# Patient Record
Sex: MALE | Race: WHITE | URBAN - METROPOLITAN AREA
[De-identification: names, ages, dates, MRNs, and addresses within clinical notes are randomized per-mention and may not be internally consistent; named-entity substitution may affect disease eponyms.]

---

## 2018-02-19 ENCOUNTER — OFFICE VISIT (OUTPATIENT)
Dept: FAMILY MEDICINE CLINIC | Age: 48
End: 2018-02-19

## 2018-02-19 ENCOUNTER — TELEPHONE (OUTPATIENT)
Dept: FAMILY MEDICINE CLINIC | Age: 48
End: 2018-02-19

## 2018-02-19 VITALS
BODY MASS INDEX: 24.79 KG/M2 | SYSTOLIC BLOOD PRESSURE: 120 MMHG | DIASTOLIC BLOOD PRESSURE: 80 MMHG | OXYGEN SATURATION: 98 % | HEIGHT: 72 IN | HEART RATE: 85 BPM | WEIGHT: 183 LBS

## 2018-02-19 DIAGNOSIS — Z13.220 SCREENING CHOLESTEROL LEVEL: ICD-10-CM

## 2018-02-19 DIAGNOSIS — R00.2 PALPITATIONS: Primary | ICD-10-CM

## 2018-02-19 PROCEDURE — 93000 ELECTROCARDIOGRAM COMPLETE: CPT | Performed by: FAMILY MEDICINE

## 2018-02-19 PROCEDURE — 99214 OFFICE O/P EST MOD 30 MIN: CPT | Performed by: FAMILY MEDICINE

## 2018-02-19 ASSESSMENT — ENCOUNTER SYMPTOMS
SHORTNESS OF BREATH: 0
COUGH: 0
CHEST TIGHTNESS: 1
WHEEZING: 0
SORE THROAT: 0

## 2018-02-19 NOTE — PROGRESS NOTES
Patient:  Porfirio bello 52 y.o. Radha Rowe presents today with the following Chief Complaint(s):  Chief Complaint   Patient presents with    Hypertension     C/o elevated BP- Pt went [facility], yesterday, 149/82. Pt states he has noticed an elevated heart rate, tightness in the chest, winded, on sunday morning. Pt states he still feels tightness in the chest.        Patient was last seen 3 years ago. He was doing fine until yesterday he started to feel strange. He can't really not be more specific than that. He does feel like his heart is racing. He denies fever chills or cough stomach pain nausea vomiting. He went to the pharmacy where they checked his blood pressure and they told him to rest. So we spent the day on the couch. Today he still feels strange. He is worried about his blood pressure being elevated. No current outpatient prescriptions on file. No current facility-administered medications for this visit. Patients past medical history, surgical history, family history, medications and allergies were all reviewed and updated as appropriate today. Review of Systems   Constitutional: Positive for fatigue. Negative for chills and fever. HENT: Negative for sore throat. Respiratory: Positive for chest tightness. Negative for cough, shortness of breath and wheezing. Cardiovascular: Positive for palpitations. Negative for chest pain and leg swelling. Physical Exam   Constitutional: No distress. Eyes: Conjunctivae are normal.   Neck: Neck supple. No JVD present. Cardiovascular: Normal rate, regular rhythm and normal heart sounds. No murmur heard. Pulmonary/Chest: Effort normal and breath sounds normal. No respiratory distress. He has no wheezes. He has no rales. Abdominal: Soft. Musculoskeletal: He exhibits no edema. Neurological: He is alert. Psychiatric: He has a normal mood and affect.        Vitals:    02/19/18 1605 02/19/18 1630 02/19/18 1631   BP: (!)

## 2018-02-27 ENCOUNTER — NURSE ONLY (OUTPATIENT)
Dept: FAMILY MEDICINE CLINIC | Age: 48
End: 2018-02-27

## 2018-02-27 DIAGNOSIS — Z13.220 SCREENING CHOLESTEROL LEVEL: ICD-10-CM

## 2018-02-27 DIAGNOSIS — R00.2 PALPITATIONS: ICD-10-CM

## 2018-02-27 PROCEDURE — 36415 COLL VENOUS BLD VENIPUNCTURE: CPT | Performed by: FAMILY MEDICINE

## 2018-02-28 LAB
A/G RATIO: 1.7 (ref 1.1–2.2)
ALBUMIN SERPL-MCNC: 4.5 G/DL (ref 3.4–5)
ALP BLD-CCNC: 67 U/L (ref 40–129)
ALT SERPL-CCNC: 18 U/L (ref 10–40)
ANION GAP SERPL CALCULATED.3IONS-SCNC: 16 MMOL/L (ref 3–16)
AST SERPL-CCNC: 19 U/L (ref 15–37)
BASOPHILS ABSOLUTE: 0.1 K/UL (ref 0–0.2)
BASOPHILS RELATIVE PERCENT: 1.3 %
BILIRUB SERPL-MCNC: 0.6 MG/DL (ref 0–1)
BUN BLDV-MCNC: 18 MG/DL (ref 7–20)
CALCIUM SERPL-MCNC: 9.5 MG/DL (ref 8.3–10.6)
CHLORIDE BLD-SCNC: 103 MMOL/L (ref 99–110)
CHOLESTEROL, TOTAL: 166 MG/DL (ref 0–199)
CO2: 24 MMOL/L (ref 21–32)
CREAT SERPL-MCNC: 0.9 MG/DL (ref 0.9–1.3)
EOSINOPHILS ABSOLUTE: 0.2 K/UL (ref 0–0.6)
EOSINOPHILS RELATIVE PERCENT: 2.5 %
ESTIMATED AVERAGE GLUCOSE: 93.9 MG/DL
GFR AFRICAN AMERICAN: >60
GFR NON-AFRICAN AMERICAN: >60
GLOBULIN: 2.6 G/DL
GLUCOSE BLD-MCNC: 90 MG/DL (ref 70–99)
HBA1C MFR BLD: 4.9 %
HCT VFR BLD CALC: 42.1 % (ref 40.5–52.5)
HDLC SERPL-MCNC: 41 MG/DL (ref 40–60)
HEMOGLOBIN: 14.8 G/DL (ref 13.5–17.5)
LDL CHOLESTEROL CALCULATED: 109 MG/DL
LYMPHOCYTES ABSOLUTE: 1.8 K/UL (ref 1–5.1)
LYMPHOCYTES RELATIVE PERCENT: 29.6 %
MCH RBC QN AUTO: 31.4 PG (ref 26–34)
MCHC RBC AUTO-ENTMCNC: 35.2 G/DL (ref 31–36)
MCV RBC AUTO: 89 FL (ref 80–100)
MONOCYTES ABSOLUTE: 0.5 K/UL (ref 0–1.3)
MONOCYTES RELATIVE PERCENT: 9.1 %
NEUTROPHILS ABSOLUTE: 3.4 K/UL (ref 1.7–7.7)
NEUTROPHILS RELATIVE PERCENT: 57.5 %
PDW BLD-RTO: 12.9 % (ref 12.4–15.4)
PLATELET # BLD: 282 K/UL (ref 135–450)
PMV BLD AUTO: 8.1 FL (ref 5–10.5)
POTASSIUM SERPL-SCNC: 4.1 MMOL/L (ref 3.5–5.1)
RBC # BLD: 4.73 M/UL (ref 4.2–5.9)
SODIUM BLD-SCNC: 143 MMOL/L (ref 136–145)
TOTAL PROTEIN: 7.1 G/DL (ref 6.4–8.2)
TRIGL SERPL-MCNC: 78 MG/DL (ref 0–150)
VLDLC SERPL CALC-MCNC: 16 MG/DL
WBC # BLD: 6 K/UL (ref 4–11)

## 2019-02-18 ENCOUNTER — OFFICE VISIT (OUTPATIENT)
Dept: FAMILY MEDICINE CLINIC | Age: 49
End: 2019-02-18
Payer: COMMERCIAL

## 2019-02-18 VITALS
HEIGHT: 72 IN | HEART RATE: 83 BPM | SYSTOLIC BLOOD PRESSURE: 126 MMHG | DIASTOLIC BLOOD PRESSURE: 80 MMHG | BODY MASS INDEX: 24.19 KG/M2 | WEIGHT: 178.6 LBS | OXYGEN SATURATION: 99 % | RESPIRATION RATE: 16 BRPM

## 2019-02-18 DIAGNOSIS — R20.0 NUMBNESS AND TINGLING: Primary | ICD-10-CM

## 2019-02-18 DIAGNOSIS — R20.2 NUMBNESS AND TINGLING: Primary | ICD-10-CM

## 2019-02-18 LAB
A/G RATIO: 1.7 (ref 1.1–2.2)
ALBUMIN SERPL-MCNC: 4.5 G/DL (ref 3.4–5)
ALP BLD-CCNC: 74 U/L (ref 40–129)
ALT SERPL-CCNC: 29 U/L (ref 10–40)
ANION GAP SERPL CALCULATED.3IONS-SCNC: 16 MMOL/L (ref 3–16)
AST SERPL-CCNC: 24 U/L (ref 15–37)
BASOPHILS ABSOLUTE: 0.1 K/UL (ref 0–0.2)
BASOPHILS RELATIVE PERCENT: 1.1 %
BILIRUB SERPL-MCNC: 0.7 MG/DL (ref 0–1)
BUN BLDV-MCNC: 13 MG/DL (ref 7–20)
CALCIUM SERPL-MCNC: 10 MG/DL (ref 8.3–10.6)
CHLORIDE BLD-SCNC: 104 MMOL/L (ref 99–110)
CO2: 23 MMOL/L (ref 21–32)
CREAT SERPL-MCNC: 1 MG/DL (ref 0.9–1.3)
EOSINOPHILS ABSOLUTE: 0.1 K/UL (ref 0–0.6)
EOSINOPHILS RELATIVE PERCENT: 2 %
GFR AFRICAN AMERICAN: >60
GFR NON-AFRICAN AMERICAN: >60
GLOBULIN: 2.7 G/DL
GLUCOSE BLD-MCNC: 115 MG/DL (ref 70–99)
HCT VFR BLD CALC: 45.2 % (ref 40.5–52.5)
HEMOGLOBIN: 15.5 G/DL (ref 13.5–17.5)
LYMPHOCYTES ABSOLUTE: 1.7 K/UL (ref 1–5.1)
LYMPHOCYTES RELATIVE PERCENT: 29.6 %
MCH RBC QN AUTO: 30.8 PG (ref 26–34)
MCHC RBC AUTO-ENTMCNC: 34.3 G/DL (ref 31–36)
MCV RBC AUTO: 89.7 FL (ref 80–100)
MONOCYTES ABSOLUTE: 0.5 K/UL (ref 0–1.3)
MONOCYTES RELATIVE PERCENT: 8 %
NEUTROPHILS ABSOLUTE: 3.4 K/UL (ref 1.7–7.7)
NEUTROPHILS RELATIVE PERCENT: 59.3 %
PDW BLD-RTO: 12.9 % (ref 12.4–15.4)
PLATELET # BLD: 330 K/UL (ref 135–450)
PMV BLD AUTO: 8.3 FL (ref 5–10.5)
POTASSIUM SERPL-SCNC: 4.2 MMOL/L (ref 3.5–5.1)
RBC # BLD: 5.04 M/UL (ref 4.2–5.9)
SODIUM BLD-SCNC: 143 MMOL/L (ref 136–145)
TOTAL PROTEIN: 7.2 G/DL (ref 6.4–8.2)
TSH SERPL DL<=0.05 MIU/L-ACNC: 0.6 UIU/ML (ref 0.27–4.2)
VITAMIN B-12: 922 PG/ML (ref 211–911)
WBC # BLD: 5.7 K/UL (ref 4–11)

## 2019-02-18 PROCEDURE — 99214 OFFICE O/P EST MOD 30 MIN: CPT | Performed by: FAMILY MEDICINE

## 2019-02-18 RX ORDER — METHYLPREDNISOLONE 4 MG/1
TABLET ORAL
Qty: 1 KIT | Refills: 0 | Status: SHIPPED | OUTPATIENT
Start: 2019-02-18 | End: 2019-08-19 | Stop reason: SDUPTHER

## 2019-02-18 ASSESSMENT — ENCOUNTER SYMPTOMS
SINUS PRESSURE: 0
SINUS PAIN: 0
SHORTNESS OF BREATH: 0
BACK PAIN: 0

## 2019-02-19 LAB
ESTIMATED AVERAGE GLUCOSE: 96.8 MG/DL
HBA1C MFR BLD: 5 %

## 2019-03-01 ENCOUNTER — HOSPITAL ENCOUNTER (EMERGENCY)
Age: 49
Discharge: HOME | End: 2019-03-01
Payer: COMMERCIAL

## 2019-03-01 ENCOUNTER — APPOINTMENT (OUTPATIENT)
Dept: GENERAL RADIOLOGY | Age: 49
End: 2019-03-01
Payer: COMMERCIAL

## 2019-03-01 VITALS
BODY MASS INDEX: 23.98 KG/M2 | OXYGEN SATURATION: 97 % | TEMPERATURE: 97.9 F | HEIGHT: 72 IN | DIASTOLIC BLOOD PRESSURE: 115 MMHG | RESPIRATION RATE: 16 BRPM | WEIGHT: 177 LBS | HEART RATE: 78 BPM | SYSTOLIC BLOOD PRESSURE: 138 MMHG

## 2019-03-01 DIAGNOSIS — F41.1 ANXIETY STATE: Primary | ICD-10-CM

## 2019-03-01 DIAGNOSIS — R03.0 ELEVATED BLOOD PRESSURE READING: ICD-10-CM

## 2019-03-01 LAB
A/G RATIO: 1.7 (ref 1.1–2.2)
ALBUMIN SERPL-MCNC: 4.8 G/DL (ref 3.4–5)
ALP BLD-CCNC: 100 U/L (ref 40–129)
ALT SERPL-CCNC: 24 U/L (ref 10–40)
ANION GAP SERPL CALCULATED.3IONS-SCNC: 16 MMOL/L (ref 3–16)
AST SERPL-CCNC: 25 U/L (ref 15–37)
BASOPHILS ABSOLUTE: 0.1 K/UL (ref 0–0.2)
BASOPHILS RELATIVE PERCENT: 1.4 %
BILIRUB SERPL-MCNC: 0.5 MG/DL (ref 0–1)
BUN BLDV-MCNC: 16 MG/DL (ref 7–20)
CALCIUM SERPL-MCNC: 10.2 MG/DL (ref 8.3–10.6)
CHLORIDE BLD-SCNC: 104 MMOL/L (ref 99–110)
CO2: 21 MMOL/L (ref 21–32)
CREAT SERPL-MCNC: 1.1 MG/DL (ref 0.9–1.3)
EOSINOPHILS ABSOLUTE: 0.1 K/UL (ref 0–0.6)
EOSINOPHILS RELATIVE PERCENT: 1.1 %
GFR AFRICAN AMERICAN: >60
GFR NON-AFRICAN AMERICAN: >60
GLOBULIN: 2.8 G/DL
GLUCOSE BLD-MCNC: 114 MG/DL (ref 70–99)
HCT VFR BLD CALC: 46.1 % (ref 40.5–52.5)
HEMOGLOBIN: 16 G/DL (ref 13.5–17.5)
LYMPHOCYTES ABSOLUTE: 2.8 K/UL (ref 1–5.1)
LYMPHOCYTES RELATIVE PERCENT: 32.5 %
MAGNESIUM: 2 MG/DL (ref 1.8–2.4)
MCH RBC QN AUTO: 30.5 PG (ref 26–34)
MCHC RBC AUTO-ENTMCNC: 34.6 G/DL (ref 31–36)
MCV RBC AUTO: 88.1 FL (ref 80–100)
MONOCYTES ABSOLUTE: 0.8 K/UL (ref 0–1.3)
MONOCYTES RELATIVE PERCENT: 8.9 %
NEUTROPHILS ABSOLUTE: 4.8 K/UL (ref 1.7–7.7)
NEUTROPHILS RELATIVE PERCENT: 56.1 %
PDW BLD-RTO: 13 % (ref 12.4–15.4)
PLATELET # BLD: 335 K/UL (ref 135–450)
PMV BLD AUTO: 7.6 FL (ref 5–10.5)
POTASSIUM SERPL-SCNC: 3.4 MMOL/L (ref 3.5–5.1)
RAPID INFLUENZA  B AGN: NEGATIVE
RAPID INFLUENZA A AGN: NEGATIVE
RBC # BLD: 5.23 M/UL (ref 4.2–5.9)
SODIUM BLD-SCNC: 141 MMOL/L (ref 136–145)
TOTAL PROTEIN: 7.6 G/DL (ref 6.4–8.2)
TROPONIN: <0.01 NG/ML
WBC # BLD: 8.6 K/UL (ref 4–11)

## 2019-03-01 PROCEDURE — 6360000002 HC RX W HCPCS: Performed by: NURSE PRACTITIONER

## 2019-03-01 PROCEDURE — 71046 X-RAY EXAM CHEST 2 VIEWS: CPT

## 2019-03-01 PROCEDURE — 93005 ELECTROCARDIOGRAM TRACING: CPT

## 2019-03-01 PROCEDURE — 84484 ASSAY OF TROPONIN QUANT: CPT

## 2019-03-01 PROCEDURE — 83735 ASSAY OF MAGNESIUM: CPT

## 2019-03-01 PROCEDURE — 99285 EMERGENCY DEPT VISIT HI MDM: CPT

## 2019-03-01 PROCEDURE — 87804 INFLUENZA ASSAY W/OPTIC: CPT

## 2019-03-01 PROCEDURE — 80053 COMPREHEN METABOLIC PANEL: CPT

## 2019-03-01 PROCEDURE — 85025 COMPLETE CBC W/AUTO DIFF WBC: CPT

## 2019-03-01 PROCEDURE — 96374 THER/PROPH/DIAG INJ IV PUSH: CPT

## 2019-03-01 RX ORDER — HYDROXYZINE 50 MG/1
25 TABLET, FILM COATED ORAL EVERY 6 HOURS PRN
Qty: 20 TABLET | Refills: 0 | Status: SHIPPED | OUTPATIENT
Start: 2019-03-01 | End: 2019-03-11

## 2019-03-01 RX ORDER — LORAZEPAM 2 MG/ML
0.5 INJECTION INTRAMUSCULAR ONCE
Status: COMPLETED | OUTPATIENT
Start: 2019-03-01 | End: 2019-03-01

## 2019-03-01 RX ADMIN — LORAZEPAM 0.5 MG: 2 INJECTION, SOLUTION INTRAMUSCULAR; INTRAVENOUS at 21:36

## 2019-03-02 LAB
EKG ATRIAL RATE: 68 BPM
EKG DIAGNOSIS: NORMAL
EKG P AXIS: 45 DEGREES
EKG P-R INTERVAL: 146 MS
EKG Q-T INTERVAL: 404 MS
EKG QRS DURATION: 92 MS
EKG QTC CALCULATION (BAZETT): 429 MS
EKG R AXIS: 49 DEGREES
EKG T AXIS: 34 DEGREES
EKG VENTRICULAR RATE: 68 BPM

## 2019-03-05 ENCOUNTER — OFFICE VISIT (OUTPATIENT)
Dept: FAMILY MEDICINE CLINIC | Age: 49
End: 2019-03-05
Payer: COMMERCIAL

## 2019-03-05 VITALS
WEIGHT: 177.6 LBS | DIASTOLIC BLOOD PRESSURE: 80 MMHG | BODY MASS INDEX: 24.09 KG/M2 | HEART RATE: 64 BPM | SYSTOLIC BLOOD PRESSURE: 130 MMHG | OXYGEN SATURATION: 98 %

## 2019-03-05 DIAGNOSIS — F41.9 ANXIETY: Primary | ICD-10-CM

## 2019-03-05 PROCEDURE — 99213 OFFICE O/P EST LOW 20 MIN: CPT | Performed by: FAMILY MEDICINE

## 2019-04-09 ENCOUNTER — OFFICE VISIT (OUTPATIENT)
Dept: FAMILY MEDICINE CLINIC | Age: 49
End: 2019-04-09
Payer: COMMERCIAL

## 2019-04-09 VITALS
OXYGEN SATURATION: 99 % | SYSTOLIC BLOOD PRESSURE: 124 MMHG | HEIGHT: 72 IN | DIASTOLIC BLOOD PRESSURE: 70 MMHG | HEART RATE: 77 BPM | BODY MASS INDEX: 24.27 KG/M2 | WEIGHT: 179.2 LBS

## 2019-04-09 DIAGNOSIS — F41.9 ANXIETY: Primary | ICD-10-CM

## 2019-04-09 PROCEDURE — 99213 OFFICE O/P EST LOW 20 MIN: CPT | Performed by: FAMILY MEDICINE

## 2019-04-09 ASSESSMENT — PATIENT HEALTH QUESTIONNAIRE - PHQ9
SUM OF ALL RESPONSES TO PHQ QUESTIONS 1-9: 0
1. LITTLE INTEREST OR PLEASURE IN DOING THINGS: 0
SUM OF ALL RESPONSES TO PHQ9 QUESTIONS 1 & 2: 0
SUM OF ALL RESPONSES TO PHQ QUESTIONS 1-9: 0
2. FEELING DOWN, DEPRESSED OR HOPELESS: 0

## 2019-05-24 DIAGNOSIS — F41.9 ANXIETY: ICD-10-CM

## 2019-08-19 ENCOUNTER — OFFICE VISIT (OUTPATIENT)
Dept: FAMILY MEDICINE CLINIC | Age: 49
End: 2019-08-19
Payer: COMMERCIAL

## 2019-08-19 VITALS
OXYGEN SATURATION: 98 % | HEART RATE: 55 BPM | WEIGHT: 179 LBS | SYSTOLIC BLOOD PRESSURE: 128 MMHG | HEIGHT: 72 IN | BODY MASS INDEX: 24.24 KG/M2 | DIASTOLIC BLOOD PRESSURE: 88 MMHG

## 2019-08-19 DIAGNOSIS — R20.2 NUMBNESS AND TINGLING: ICD-10-CM

## 2019-08-19 DIAGNOSIS — R20.0 NUMBNESS AND TINGLING: ICD-10-CM

## 2019-08-19 DIAGNOSIS — M79.2 RADICULAR PAIN IN LEFT ARM: Primary | ICD-10-CM

## 2019-08-19 PROCEDURE — 99213 OFFICE O/P EST LOW 20 MIN: CPT | Performed by: FAMILY MEDICINE

## 2019-08-19 RX ORDER — METHYLPREDNISOLONE 4 MG/1
TABLET ORAL
Qty: 1 KIT | Refills: 0 | Status: SHIPPED | OUTPATIENT
Start: 2019-08-19 | End: 2019-08-25

## 2019-08-19 NOTE — PROGRESS NOTES
Patient:  Audry Skiff a 52 y.o. [facility] presents today with the following Chief Complaint(s):  Chief Complaint   Patient presents with    Arm Pain     Whole arm, hand, and shoulder in pain. Taking ibuprofen and ice therapy. Not sleeping well and waking up in pain x7 days. Months ago patient was seen for a left arm numbness and tingling. That eventually did subside. He also was having tremendous amount of anxiety. His anxiety is also improved. He is no longer taking his Zoloft. Patient complains of a weeklong history of pain in his shoulder and arm and hand. Is worse at nighttime. He recently moved multiple boxes from his basement and his parents basement. He thinks that is what triggered this. He has been taken Advil without relief        Current Outpatient Medications   Medication Sig Dispense Refill    methylPREDNISolone (MEDROL, DEEPA,) 4 MG tablet By mouth. 1 kit 0     No current facility-administered medications for this visit. Patients past medical history, surgical history, family history, medications and allergies were all reviewed and updated as appropriate today. Review of Systems   Constitutional: Negative for fever. Musculoskeletal: Positive for neck pain and neck stiffness. Neurological: Positive for numbness. Negative for weakness. Physical Exam   Constitutional: No distress. Cardiovascular: Normal rate and regular rhythm. Pulmonary/Chest: Effort normal and breath sounds normal.   Musculoskeletal:        Cervical back: He exhibits normal range of motion, no tenderness and no spasm. Good range of motion of the C-spine. Motor strength is 5/5   Vitals reviewed. Vitals:    08/19/19 1406   BP: 128/88   Pulse: 55   SpO2: 98%   Weight: 179 lb (81.2 kg)   Height: 6' (1.829 m)       Assessment/Plan:   Mirza Mcallister was seen today for arm pain.     Diagnoses and all orders for this visit:    Radicular pain in left arm  -     methylPREDNISolone (MEDROL,

## 2019-09-10 ENCOUNTER — HOSPITAL ENCOUNTER (OUTPATIENT)
Dept: PHYSICAL THERAPY | Age: 49
Setting detail: THERAPIES SERIES
Discharge: HOME | End: 2019-09-10
Payer: COMMERCIAL

## 2019-09-10 PROCEDURE — 97162 PT EVAL MOD COMPLEX 30 MIN: CPT

## 2019-09-10 PROCEDURE — 97140 MANUAL THERAPY 1/> REGIONS: CPT

## 2019-09-10 ASSESSMENT — PAIN SCALES - GENERAL: PAINLEVEL_OUTOF10: 9

## 2019-09-10 NOTE — PROGRESS NOTES
S' and UT with endrange. Spine  Cervical: Rotation B with tightness L side UT. SB R limited by tightness in L. Flexion limited by tightness and pain in the L UT.    Joint Mobility  Spine: Elevated L 1st and 2nd rib. Hypomobility R side glide C3-7 worsens as you go inferiorly. Hypomobility CTJ and upper thoracic    Strength RUE  Strength RUE: WNL  Comment:  40, 45, 40#  Strength LUE  Comment: S' flexion 4/5 with pain, S' abduction 4-/5 with severe pain; E' flexion 4-/5 with pain; E' extension 4/5 with pain;  12, 15, 12#; Mid trap 3-/5; low trap 3-/5; serratus 3-/5   Strength Other  Other: SNF 4-/5      Additional Measures  Special Tests: Denies CA, steroid use. (-) CN, (-) vertebral artery, (-) hoffmans, (-) clonus  Other: (-) compression and distraction cervical spine. Assessment   Conditions Requiring Skilled Therapeutic Intervention  Body structures, Functions, Activity limitations: Decreased endurance;Decreased ROM; Decreased strength  Assessment: Pt presents with multiple joint dysfunctions of the L upper quarter including ERS cervical spine, AGMR L S', L elbow joints dysfunction. Neural involvement noted but not teased out today due to patient pain and apprehension. Signfiicant  strength gains and cervical ROM gains with manual therapy this session. Prognosis: Good  Decision Making: Medium Complexity  REQUIRES PT FOLLOW UP: Yes         Plan   Plan  Times per week: 2x/wk for 4 weeks   Current Treatment Recommendations: Strengthening, Manual Therapy - Joint Manipulation, ROM, Neuromuscular Re-education, Endurance Training, Manual Therapy - Soft Tissue Mobilization, Home Exercise Program    G-Code  Quick Dash 84% disability     Goals  Long term goals  Time Frame for Long term goals : 4 weeks   Long term goal 1: Pt independent with HEP   Long term goal 2: Pt  strength to 30# L UE  Long term goal 3: Pt S' strength to improve by 1/3 muscle grade grossly.    Long term goal 4: Normal

## 2019-09-13 ENCOUNTER — HOSPITAL ENCOUNTER (OUTPATIENT)
Dept: PHYSICAL THERAPY | Age: 49
Setting detail: THERAPIES SERIES
Discharge: HOME | End: 2019-09-13
Payer: COMMERCIAL

## 2019-09-13 PROCEDURE — 97110 THERAPEUTIC EXERCISES: CPT

## 2019-09-13 PROCEDURE — 97140 MANUAL THERAPY 1/> REGIONS: CPT

## 2019-09-17 ENCOUNTER — APPOINTMENT (OUTPATIENT)
Dept: PHYSICAL THERAPY | Age: 49
End: 2019-09-17
Payer: COMMERCIAL

## 2019-09-19 ENCOUNTER — HOSPITAL ENCOUNTER (OUTPATIENT)
Dept: PHYSICAL THERAPY | Age: 49
Setting detail: THERAPIES SERIES
Discharge: HOME | End: 2019-09-19
Payer: COMMERCIAL

## 2019-09-19 PROCEDURE — 97140 MANUAL THERAPY 1/> REGIONS: CPT

## 2019-09-19 PROCEDURE — 97112 NEUROMUSCULAR REEDUCATION: CPT

## 2019-09-24 ENCOUNTER — HOSPITAL ENCOUNTER (OUTPATIENT)
Dept: PHYSICAL THERAPY | Age: 49
Setting detail: THERAPIES SERIES
Discharge: HOME | End: 2019-09-24
Payer: COMMERCIAL

## 2019-09-24 PROCEDURE — 97140 MANUAL THERAPY 1/> REGIONS: CPT

## 2019-09-24 NOTE — FLOWSHEET NOTE
strengthening NV and treat neural components as necessary. Treatment/Activity Tolerance:   [x]Patient tolerated treatment well [] Patient limited by fatique  []Patient limited by pain [] Patient limited by other medical complications  [] Other:     Goals:        Long term goals  Time Frame for Long term goals : 4 weeks   Long term goal 1: Pt independent with HEP   Long term goal 2: Pt  strength to 30# L UE  Long term goal 3: Pt S' strength to improve by 1/3 muscle grade grossly.    Long term goal 4: Normal function L elbow and S' joints  Long term goal 5: Return to PLOF      Plan: [x] Continue per plan of care [] Alter current plan (see comments)   [] Plan of care initiated [] Hold pending MD visit [] Discharge      Plan for Next Session:      Re-Certification Due Date:         Signature:  China Velásquez PT

## 2019-09-26 ENCOUNTER — HOSPITAL ENCOUNTER (OUTPATIENT)
Dept: PHYSICAL THERAPY | Age: 49
Setting detail: THERAPIES SERIES
Discharge: HOME | End: 2019-09-26
Payer: COMMERCIAL

## 2019-09-26 PROCEDURE — 97110 THERAPEUTIC EXERCISES: CPT

## 2019-09-26 PROCEDURE — 97140 MANUAL THERAPY 1/> REGIONS: CPT

## 2019-10-01 ENCOUNTER — HOSPITAL ENCOUNTER (OUTPATIENT)
Dept: PHYSICAL THERAPY | Age: 49
Setting detail: THERAPIES SERIES
Discharge: HOME | End: 2019-10-01
Payer: COMMERCIAL

## 2019-10-01 PROCEDURE — 97140 MANUAL THERAPY 1/> REGIONS: CPT

## 2019-10-03 ENCOUNTER — HOSPITAL ENCOUNTER (OUTPATIENT)
Dept: PHYSICAL THERAPY | Age: 49
Setting detail: THERAPIES SERIES
Discharge: HOME | End: 2019-10-03
Payer: COMMERCIAL

## 2019-10-03 PROCEDURE — 97140 MANUAL THERAPY 1/> REGIONS: CPT

## 2019-10-03 PROCEDURE — 97110 THERAPEUTIC EXERCISES: CPT

## 2019-10-14 ENCOUNTER — HOSPITAL ENCOUNTER (OUTPATIENT)
Dept: PHYSICAL THERAPY | Age: 49
Setting detail: THERAPIES SERIES
Discharge: HOME | End: 2019-10-14
Payer: COMMERCIAL

## 2019-10-14 PROCEDURE — 97140 MANUAL THERAPY 1/> REGIONS: CPT

## 2019-10-18 ENCOUNTER — OFFICE VISIT (OUTPATIENT)
Dept: FAMILY MEDICINE CLINIC | Age: 49
End: 2019-10-18
Payer: COMMERCIAL

## 2019-10-18 VITALS
HEART RATE: 72 BPM | SYSTOLIC BLOOD PRESSURE: 122 MMHG | WEIGHT: 182 LBS | BODY MASS INDEX: 24.65 KG/M2 | DIASTOLIC BLOOD PRESSURE: 86 MMHG | HEIGHT: 72 IN | OXYGEN SATURATION: 99 %

## 2019-10-18 DIAGNOSIS — M79.2 RADICULAR PAIN IN LEFT ARM: Primary | ICD-10-CM

## 2019-10-18 PROCEDURE — 99213 OFFICE O/P EST LOW 20 MIN: CPT | Performed by: FAMILY MEDICINE

## 2019-10-29 ENCOUNTER — HOSPITAL ENCOUNTER (OUTPATIENT)
Dept: PHYSICAL THERAPY | Age: 49
Setting detail: THERAPIES SERIES
Discharge: HOME | End: 2019-10-29
Payer: COMMERCIAL

## 2019-10-29 PROCEDURE — 97140 MANUAL THERAPY 1/> REGIONS: CPT

## 2019-10-31 ENCOUNTER — HOSPITAL ENCOUNTER (OUTPATIENT)
Dept: PHYSICAL THERAPY | Age: 49
Setting detail: THERAPIES SERIES
Discharge: HOME | End: 2019-10-31
Payer: COMMERCIAL

## 2019-10-31 PROCEDURE — 97110 THERAPEUTIC EXERCISES: CPT

## 2019-10-31 PROCEDURE — 97140 MANUAL THERAPY 1/> REGIONS: CPT

## 2019-11-05 ENCOUNTER — HOSPITAL ENCOUNTER (OUTPATIENT)
Dept: PHYSICAL THERAPY | Age: 49
Setting detail: THERAPIES SERIES
Discharge: HOME | End: 2019-11-05
Payer: COMMERCIAL

## 2019-11-05 PROCEDURE — 97110 THERAPEUTIC EXERCISES: CPT

## 2019-11-05 PROCEDURE — 97140 MANUAL THERAPY 1/> REGIONS: CPT

## 2019-11-07 ENCOUNTER — HOSPITAL ENCOUNTER (OUTPATIENT)
Dept: PHYSICAL THERAPY | Age: 49
Setting detail: THERAPIES SERIES
Discharge: HOME | End: 2019-11-07
Payer: COMMERCIAL

## 2019-11-07 PROCEDURE — 97110 THERAPEUTIC EXERCISES: CPT

## 2019-11-12 ENCOUNTER — HOSPITAL ENCOUNTER (OUTPATIENT)
Dept: PHYSICAL THERAPY | Age: 49
Setting detail: THERAPIES SERIES
Discharge: HOME | End: 2019-11-12
Payer: COMMERCIAL

## 2019-11-12 PROCEDURE — 97110 THERAPEUTIC EXERCISES: CPT

## 2019-11-14 ENCOUNTER — HOSPITAL ENCOUNTER (OUTPATIENT)
Dept: PHYSICAL THERAPY | Age: 49
Setting detail: THERAPIES SERIES
Discharge: HOME | End: 2019-11-14
Payer: COMMERCIAL

## 2019-11-14 PROCEDURE — 97110 THERAPEUTIC EXERCISES: CPT

## 2019-11-19 ENCOUNTER — HOSPITAL ENCOUNTER (OUTPATIENT)
Dept: PHYSICAL THERAPY | Age: 49
Setting detail: THERAPIES SERIES
Discharge: HOME | End: 2019-11-19
Payer: COMMERCIAL

## 2019-11-19 PROCEDURE — 97110 THERAPEUTIC EXERCISES: CPT

## 2019-11-21 ENCOUNTER — HOSPITAL ENCOUNTER (OUTPATIENT)
Dept: PHYSICAL THERAPY | Age: 49
Setting detail: THERAPIES SERIES
Discharge: HOME | End: 2019-11-21
Payer: COMMERCIAL

## 2019-11-21 PROCEDURE — 97110 THERAPEUTIC EXERCISES: CPT

## 2020-09-28 ENCOUNTER — OFFICE VISIT (OUTPATIENT)
Dept: FAMILY MEDICINE CLINIC | Age: 50
End: 2020-09-28
Payer: COMMERCIAL

## 2020-09-28 VITALS
OXYGEN SATURATION: 97 % | HEART RATE: 72 BPM | DIASTOLIC BLOOD PRESSURE: 84 MMHG | TEMPERATURE: 98.2 F | SYSTOLIC BLOOD PRESSURE: 122 MMHG | BODY MASS INDEX: 24.47 KG/M2 | WEIGHT: 180.4 LBS

## 2020-09-28 PROCEDURE — 90686 IIV4 VACC NO PRSV 0.5 ML IM: CPT | Performed by: FAMILY MEDICINE

## 2020-09-28 PROCEDURE — 90471 IMMUNIZATION ADMIN: CPT | Performed by: FAMILY MEDICINE

## 2020-09-28 PROCEDURE — 99213 OFFICE O/P EST LOW 20 MIN: CPT | Performed by: FAMILY MEDICINE

## 2020-09-28 ASSESSMENT — PATIENT HEALTH QUESTIONNAIRE - PHQ9
SUM OF ALL RESPONSES TO PHQ QUESTIONS 1-9: 0
1. LITTLE INTEREST OR PLEASURE IN DOING THINGS: 0
2. FEELING DOWN, DEPRESSED OR HOPELESS: 0
SUM OF ALL RESPONSES TO PHQ QUESTIONS 1-9: 0
SUM OF ALL RESPONSES TO PHQ9 QUESTIONS 1 & 2: 0

## 2020-09-28 NOTE — PROGRESS NOTES
Patient:  King Lory bello 48 y.o. Adla Robb presents today with the following Chief Complaint(s):  Chief Complaint   Patient presents with    Shoulder Pain     Physical therapy last year and got better. does not have full use. bio freeze when needed . Patient was seen 1 year ago for cervical radicular left shoulder and arm pain. He worked with our physical therapist and his pain has definitely improved. He was released to a home exercise program and he has been doing his home exercises. He says that his pain was much more intense and now it is leveled off there is certain movements that seem to aggravate his pain but it is no longer shooting down from his neck to his hand. There are 2 focal spots where he has discomfort with certain arm and shoulder movements. He denies weakness in his left arm. No fall or trauma recent or remote        No current outpatient medications on file. No current facility-administered medications for this visit. Patients past medical history, surgical history, family history, medications and allergies were all reviewed and updated as appropriate today. Review of Systems   Constitutional: Negative for fever. Musculoskeletal: Positive for myalgias. Negative for neck pain. Physical Exam  Vitals signs reviewed. Constitutional:       General: He is not in acute distress. Appearance: He is not ill-appearing. Cardiovascular:      Rate and Rhythm: Normal rate and regular rhythm. Heart sounds: Normal heart sounds. Pulmonary:      Breath sounds: Normal breath sounds. Musculoskeletal:      Left shoulder: He exhibits normal range of motion, no tenderness and normal strength. Arms:       Comments: Patient has full range of motion there is just pain with passive range of motion. Pain with abduction, flexion and extension. No tenderness along the trapezius motor strength is 5/5   Neurological:      Mental Status: He is alert. Vitals:    09/28/20 1335   BP: 122/84   Pulse: 72   Temp: 98.2 °F (36.8 °C)   SpO2: 97%   Weight: 180 lb 6.4 oz (81.8 kg)       Assessment/Plan:   Cathlean Riding was seen today for shoulder pain. Diagnoses and all orders for this visit:    Chronic left shoulder pain, patient worked with physical therapy and was able to reduce his level of pain, however he continues with pain with certain movements.   Patient symptoms have improved but not resolved with conservative treatment will refer to orthopedics  -     Monse Singleton MD, Orthopedic Surgery, [facility]    Needs flu shot  -     INFLUENZA, QUADV, 3 YRS AND OLDER, IM PF, PREFILL SYR OR SDV, 0.5ML (DAWSON Nickerson)

## 2020-09-28 NOTE — PROGRESS NOTES
Vaccine Information Sheet, \"Influenza - Inactivated\"  given to Ed Slater, or parent/legal guardian of  Ed Slater and verbalized understanding. Patient responses:    Have you ever had a reaction to a flu vaccine? No  Do you have any current illness? No  Have you ever had Guillian Endeavor Syndrome? No  Do you have a serious allergy to any of the follow: Neomycin, Polymyxin, Thimerosal, eggs or egg products? No    Flu vaccine given per order. Please see immunization tab. Risks and benefits explained. Current VIS given.       Immunizations Administered     Name Date Dose Route    Influenza, Quadv, IM, PF (6 mo and older Fluzone, Flulaval, Fluarix, and 3 yrs and older Afluria) 9/28/2020 0.5 mL Intramuscular    Site: Deltoid- Right    Lot: [id number]    NDC: 37311-775-64

## 2020-09-29 ENCOUNTER — OFFICE VISIT (OUTPATIENT)
Dept: ORTHOPEDIC SURGERY | Age: 50
End: 2020-09-29
Payer: COMMERCIAL

## 2020-09-29 VITALS — BODY MASS INDEX: 24.46 KG/M2 | HEIGHT: 72 IN | WEIGHT: 180.6 LBS

## 2020-09-29 PROCEDURE — 99242 OFF/OP CONSLTJ NEW/EST SF 20: CPT | Performed by: ORTHOPAEDIC SURGERY

## 2020-09-29 NOTE — PROGRESS NOTES
SHOULDER CONSULTATION    Referring Provider: Dr. Juan Meza    Primary Care Provider: Same    Chief Complaint    Pain (LEFT SHOULDER)      History of Present Illness:  Raphael Florian is a 48 y.o. male who presents today thoughtfully referred for new patient evaluation and left shoulder consultation. He is a quite pleasant gentleman who almost a year ago has what sounds like a cervical radiculopathy. He was treated with oral steroids and some therapy at the [facility]. It was quite helpful in his periscapular pain and numbness and tingling down the arm have resolved. However, he has been left with some persistent and mild anterolateral shoulder pain. Some loss of certain movements. Difficulty doing any upper extremity resistance training. He is primarily a runner however. No numbness and tingling anymore. Health is been stable. Moderate frustration. Pain Assessment  Location of Pain: Shoulder  Location Modifiers: Left  Severity of Pain: 7  Quality of Pain: Dull, Aching  Duration of Pain: A few minutes  Frequency of Pain: Intermittent  Date Pain First Started: (1 YEAR AGO)  Aggravating Factors: Bending, Exercise, Straightening  Limiting Behavior: Yes  Relieving Factors: Rest  Result of Injury: Yes  Work-Related Injury: No  Are there other pain locations you wish to document?: No    Medical History:  Patient's medications, allergies, past medical, surgical, social and family histories were reviewed and updated as appropriate. Review of Systems:  Pertinent items are noted in HPI  Review of systems reviewed from Patient History Form dated on September 29 and available in the patient's chart under the Media tab. Vitals:    09/29/20 1000   Weight: 180 lb 9.6 oz (81.9 kg)   Height: 6' (1.829 m)           General Exam:   Constitutional: Patient is adequately groomed with no evidence of malnutrition  Mental Status: The patient is oriented to time, place and person.   The patient's mood the anatomy and biomechanics of the shoulder. We discussed how the tightness of the posterior capsule leads to secondary subacromial impingement and some of the pains that he is experiencing. I think it is very unlikely he has a severe rotator cuff tear and I think his prognosis with conservative treatment is excellent. If were able to stretch out the posterior capsule, resolve his secondary subacromial impingement and he will see some excellent improvement. He is given a referral for therapy. 6 to 8-week follow-up. If not improving would consider advanced imaging. We appreciate the opportunity to care for this patient. The trust that is implicit in this referral does not go unnoticed. We will do our very best to provide high-quality care that goes above and beyond standards. Please feel free contact us with any questions or concerns about this patient. [address] [facility] Partner of [facility] and Sports Medicine Surgery     This dictation was performed with a verbal recognition program (DRAGON) and it was checked for errors. It is possible that there are still dictated errors within this office note. If so, please bring any errors to my attention for an addendum. All efforts were made to ensure that this office note is accurate.

## 2020-10-05 ENCOUNTER — HOSPITAL ENCOUNTER (OUTPATIENT)
Dept: PHYSICAL THERAPY | Age: 50
Setting detail: THERAPIES SERIES
Discharge: HOME | End: 2020-10-05
Payer: COMMERCIAL

## 2020-10-05 PROCEDURE — 97140 MANUAL THERAPY 1/> REGIONS: CPT

## 2020-10-05 PROCEDURE — 97161 PT EVAL LOW COMPLEX 20 MIN: CPT

## 2020-10-05 PROCEDURE — 97110 THERAPEUTIC EXERCISES: CPT

## 2020-10-05 NOTE — FLOWSHEET NOTE
[place] [address] [facility],  [address]  Phone: [phone]  Fax [phone]    Physical Therapy Daily Treatment Note  Date:  10/5/2020    Patient Name:  Wilma Chauhan    :  1970  MRN: [id number]  Restrictions/Precautions:    Physician Information:  Referring Practitioner: Mesfin Larios  Medical/Treatment Diagnosis Information:  · Diagnosis: M25.512 (ICD-10-CM) - Left shoulder pain, unspecified chronicity  · Treatment Diagnosis: Left shoulder pain M25.512, left shoulder stiffness M25.612, left shoulder general weakness M62.82      [x] Conservative / [] Surgical - DOS:  Therapy Diagnosis/Practice Pattern:  Practice Pattern E: Localized Inflammation   Insurance/Certification information:  PT Insurance Information:  BCBS - [id number]  Plan of care signed: [x] YES  [] NO  Number of Comorbidities:  []0     [x]1-2    []3+  Date of Patient follow up with Physician:     Is this a Progress Report:     []  Yes  [x]  No        If Yes:  Date Range for reporting period:  Beginning 10/5/2020  Ending     Progress report will be due (10 Rx or 30 days whichever is less):        Recertification will be due (POC Duration  / 90 days whichever is less): 2020     Progress Note: [x]  Yes  []  No  Next due by: Visit #10        Latex Allergy:  [x]NO      []YES  Preferred Language for Healthcare:   [x]English       []other:    Visit # Insurance Allowable Reporting Period   1 60 PT Begin Date: 10/5/2020               End Date:      SUBJECTIVE:  Reprint HEP - missing low trap set with band code is updated    OBJECTIVE: See eval   Observation:   Palpation:     Test used Initial score Current Score   Pain Summary VAS 0-8    Functional questionnaire QDash 48%  32/55    ROM flexion 155 P! 165 P! ER T1 P!     ABD      IR L3 P! Strength flexion 4+ P! ER 4 P!     ABD 4- P! IR 4- P! RESTRICTIONS/PRECAUTIONS: none    Exercises/Interventions: 1x through for HEP review - 2-3 sets for HEP  Therapeutic Ex Sets/reps Notes HEP   Supine pec stretch 1 x 30\"  X   Supine snow nkechi  Supine alt UE ext 10 x  10 x  X   Quadruped thread the needle  Quadruped scap lift 10 x  10 x  X   TB wall slide 10 x  X                                                                                       Pt ed: anatomy, PT progression, RICE 8 min     Manual Intervention      PROM, grade 1-3 joint mobs 10 min Focused on inferior glide of GHJ progressed from G1-3                                        NMR re-education                                                        Access Code: [email or web address]   URL: [employer]/   Date: 10/05/2020   Prepared by: Ramirez Peralta     Exercises  Supine Chest Stretch on Foam Roll - 2-3 sets - 10 Breaths - 1-2x daily - 7x weekly  Supine Snow nkechi - 10 reps - 2-3 sets - 1-2x daily - 7x weekly  Dead Bug Alternating Arm Extension - 10 reps - 2-3 sets - 1-2x daily - 7x weekly  Quadruped Thoracic Rotation - Reach Under - 10 reps - 2-3 sets - 1-2x daily - 7x weekly  Quadruped Shoulder Flexion with Supination and Lift - 10 reps - 2-3 sets - 1-2x daily - 7x weekly  Standing Low Trap Setting with Resistance at [address] 10 reps - 2-3 sets - 1-2x daily - 7x weekly    Therapeutic Exercise and NMR EXR  [x] (64039) Provided verbal/tactile cueing for activities related to strengthening, flexibility, endurance, ROM  for improvements in scapular, scapulothoracic and UE control with self care, reaching, carrying, lifting, house/yardwork, driving/computer work.    [] (08050) Provided verbal/tactile cueing for activities related to improving balance, coordination, kinesthetic sense, posture, motor skill, proprioception  to assist with  scapular, scapulothoracic and UE control with self care, reaching, carrying, lifting, house/yardwork, driving/computer work.     Therapeutic Activities:    [] (07237 or 37488) Provided verbal/tactile cueing for activities related to improving balance, coordination, kinesthetic sense, posture, motor skill, proprioception and motor activation to allow for proper function of scapular, scapulothoracic and UE control with self care, carrying, lifting, driving/computer work.      Home Exercise Program:    [x] (17364) Reviewed/Progressed HEP activities related to strengthening, flexibility, endurance, ROM of scapular, scapulothoracic and UE control with self care, reaching, carrying, lifting, house/yardwork, driving/computer work  [] (16643) Reviewed/Progressed HEP activities related to improving balance, coordination, kinesthetic sense, posture, motor skill, proprioception of scapular, scapulothoracic and UE control with self care, reaching, carrying, lifting, house/yardwork, driving/computer work      Manual Treatments:  PROM / STM / Oscillations-Mobs:  G-I, II, III, IV (PA's, Inf., Post.)  [x] (53877) Provided manual therapy to mobilize soft tissue/joints of cervical/CT, scapular GHJ and UE for the purpose of modulating pain, promoting relaxation,  increasing ROM, reducing/eliminating soft tissue swelling/inflammation/restriction, improving soft tissue extensibility and allowing for proper ROM for normal function with self care, reaching, carrying, lifting, house/yardwork, driving/computer work    Modalities:      [] GR/ESU 15 min    [] GR 15 min   [] ESU     [] CP    [] MHP    [x] declined    Charges:  Timed Code Treatment Minutes: 30   Total Treatment Minutes: 50     [x] EVAL (LOW) 13581 (typically 20 minutes face-to-face)  [] EVAL (MOD) 74931 (typically 30 minutes face-to-face)  [] EVAL (HIGH) 57268 (typically 45 minutes face-to-face)  [] RE-EVAL     [x] ZD(22830) x 1     [] IONTO  [] NMR (18869) x     [] VASO  [x] Manual (64383) x 1    [] Other:  [] TA x      [] Mech Traction (94953)  [] ES(attended) (95153)      [] ES (un) (81025):     GOALS: Patient stated goal: Pain free shoulder  [] Progressing: [] Met: [] Not Met: [] Adjusted    Therapist goals for Patient:   Short Term Goals: To be achieved in: 2 weeks  1. Independent in HEP and progression per patient tolerance, in order to prevent re-injury. [] Progressing: [] Met: [] Not Met: [] Adjusted  2. Patient will have a decrease in pain to facilitate improvement in movement, function, and ADLs as indicated by Functional Deficits. [] Progressing: [] Met: [] Not Met: [] Adjusted    Long Term Goals: To be achieved in: 12 weeks  1. Disability index score of 20% or less for the [facility] to assist with reaching prior level of function. [] Progressing: [] Met: [] Not Met: [] Adjusted  2. Patient will demonstrate increased AROM to 160 flex, T11 IR all painfree to allow for proper joint functioning as indicated by patients Functional Deficits. [] Progressing: [] Met: [] Not Met: [] Adjusted  3. Patient will demonstrate an increase in Strength to 4+/5 pain free to allow for proper functional mobility as indicated by patients Functional Deficits. [] Progressing: [] Met: [] Not Met: [] Adjusted  4. Patient will return to normal lifting and daily activites without increased symptoms or restriction. [] Progressing: [] Met: [] Not Met: [] Adjusted  5. Patient will demonstrate appropriate scapular mechanics with no UT compensation to reduce risk of re-injury. [] Progressing: [] Met: [] Not Met: [] Adjusted      Overall Progression Towards Functional goals/ Treatment Progress Update:  [] Patient is progressing as expected towards functional goals listed. [] Progression is slowed due to complexities/Impairments listed. [] Progression has been slowed due to co-morbidities.   [x] Plan just implemented, too soon to assess goals progression <30days   [] Goals require adjustment due to lack of progress  [] Patient is not progressing as expected and requires additional follow up with physician  [] Other    Prognosis for POC: [x] Good []

## 2020-10-05 NOTE — PLAN OF CARE
[facility], [address] [facility]  [address] [place] [address], [address] [place] [address]  Phone: [phone]  Fax [phone]     Physical Therapy Certification    Dear Referring Practitioner: Issac Larios,    We had the pleasure of evaluating the following patient for physical therapy services at [address] [place] [address]. A summary of our findings can be found in the initial assessment below. This includes our plan of care. If you have any questions or concerns regarding these findings, please do not hesitate to contact me at the office phone number checked above. Thank you for the referral.       Physician Signature:_______________________________Date:__________________  By signing above (or electronic signature), therapists plan is approved by physician    Patient: Donny Rivera   : 1970   MRN: [id number]  Referring Physician: Referring Practitioner: Issac Larios      Evaluation Date: 10/5/2020      Medical Diagnosis Information:  Diagnosis: M65.508 (ICD-10-CM) - Left shoulder pain, unspecified chronicity   Treatment Diagnosis: Left shoulder pain M25.512, left shoulder stiffness M25.612, left shoulder general weakness M62.82                                         Insurance information: PT Insurance Information:  BCBS - 500D-80/20-$0CP-60PT/OT    Precautions/ Contra-indications: none  Latex Allergy:  [x]NO      []YES  Preferred Language for Healthcare:   [x]English       []other:    SUBJECTIVE: Patient stated complaint: Pt reports shoulder has been bothering him for awhile, over past month has become just shoulder pain. Initially was radiculopathy, spine and down arm - was treated at the [facility] clinic for this at the end of last year. Continued to do his exercises over the course of this year. Has not changed anything and now shoulder bothering him more. 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   Feels a little limited in ROM on left with certain motions, overhead movements also bother him. Pain is anterolateral shoulder, no posterior pain, no anterior pain. Really only feels pain during aggravating exercise. Sometimes sleeping on left side wakes him up. Relevant Medical History: prior radiculopathy on left side   Functional Disability Index: Quick DASH 11 48%   32/55    Pain Scale: 7 at worst 0 at rest/10  Easing factors: rest, avoid, stretching movements   Provocative factors: cutting grass, lifting boxes, some yoga movements, putting jacket on    Type: []Constant   [x]Intermittent  []Radiating []Localized []other:Sharp     Numbness/Tingling: denies    Occupation/School: desk job,      Living Status/Prior Level of Function: Independent with ADLs and IADLs, running, yoga,     OBJECTIVE:     CERV ROM Clear    Cervical Flexion     Cervical Extension     Cervical SB     Cervical rotation          ROM Left Right   Shoulder Flex 155 P! Shoulder Abd     Shoulder ER T1 P! T2   Shoulder IR L3 P! T9                  Strength  Left Right   Shoulder Flex 4+ P! Shoulder abd 4- P! Shoulder ER 4 P! Shoulder IR 4- P! Reflexes/Sensation: NT no neural complaints   []Dermatomes/Myotomes intact    [x]Reflexes equal and normal bilaterally   []Other:    Joint mobility:    []Normal    [x]Hypo - inferior glide restricted   []Hyper    Palpation: TTP over anterior lateral shoulder - supra insertion    Functional Mobility/Transfers: independent, poor scapular rhythm with limited inferior glide     Posture: forward head, rounded shoulders     Bandages/Dressings/Incisions: na    Gait: (include devices/WB status): WNL    Orthopedic Special Tests: + neer, - speeds, slight + myrick                       [x] Patient history, allergies, meds reviewed. Medical chart reviewed. See intake form. Review Of Systems (ROS):  [x]Performed Review of systems (Integumentary, CardioPulmonary, Neurological) by intake and observation.  Intake form has been scanned into medical record. Patient has been instructed to contact their primary care physician regarding ROS issues if not already being addressed at this time. Co-morbidities/Complexities (which will affect course of rehabilitation):   [x]None           Arthritic conditions   []Rheumatoid arthritis (M05.9)  []Osteoarthritis (M19.91)   Cardiovascular conditions   []Hypertension (I10)  []Hyperlipidemia (E78.5)  []Angina pectoris (I20)  []Atherosclerosis (I70)   Musculoskeletal conditions   []Disc pathology   []Congenital spine pathologies   []Prior surgical intervention  []Osteoporosis (M81.8)  []Osteopenia (M85.8)   Endocrine conditions   []Hypothyroid (E03.9)  []Hyperthyroid Gastrointestinal conditions   []Constipation (Q19.18)   Metabolic conditions   []Morbid obesity (E66.01)  []Diabetes type 1(E10.65) or 2 (E11.65)   []Neuropathy (G60.9)     Pulmonary conditions   []Asthma (J45)  []Coughing   []COPD (J44.9)   Psychological Disorders  []Anxiety (F41.9)  []Depression (F32.9)   []Other:   []Other:          Barriers to/and or personal factors that will affect rehab potential:              []Age  []Sex              []Motivation/Lack of Motivation                        []Co-Morbidities              []Cognitive Function, education/learning barriers              []Environmental, home barriers              []profession/work barriers  [x]past PT/medical experience  []other:  Justification:      Falls Risk Assessment (30 days):   [x] Falls Risk assessed and no intervention required.   [] Falls Risk assessed and Patient requires intervention due to being higher risk   TUG score (>12s at risk):     [] Falls education provided, including     ASSESSMENT:   Functional Impairments   [x]Noted spinal or UE joint hypomobility   []Noted spinal or UE joint hypermobility   [x]Decreased UE functional ROM   [x]Decreased UE functional strength   []Abnormal reflexes/sensation/myotomal/dermatomal deficits   [x]Decreased RC/scapular/core strength and neuromuscular control   []other:      Functional Activity Limitations (from functional questionnaire and intake)   []Reduced ability to tolerate prolonged functional positions   [x]Reduced ability or difficulty with changes of positions or transfers between positions   []Reduced ability to maintain good posture and demonstrate good body mechanics with sitting, bending, and lifting   [x] Reduced ability or tolerance with driving and/or computer work   [x]Reduced ability to sleep   [x]Reduced ability to perform lifting, reaching, carrying tasks   [x]Reduced ability to tolerate impact through UE   [x]Reduced ability to reach behind back   [x]Reduced ability to  or hold objects   [x]Reduced ability to throw or toss an object   []other:    Participation Restrictions   []Reduced participation in self care activities   [x]Reduced participation in home management activities   [x]Reduced participation in work activities   [x]Reduced participation in social activities. [x]Reduced participation in sport/recreation activities. Classification:   []Signs/symptoms consistent with post-surgical status including decreased ROM, strength and function.   []Signs/symptoms consistent with joint sprain/strain   [x]Signs/symptoms consistent with shoulder impingement   []Signs/symptoms consistent with shoulder/elbow/wrist tendinopathy   []Signs/symptoms consistent with Rotator cuff tear   []Signs/symptoms consistent with labral tear   [x]Signs/symptoms consistent with postural dysfunction    []Signs/symptoms consistent with Glenohumeral IR Deficit - <45 degrees   []Signs/symptoms consistent with facet dysfunction of cervical/thoracic spine    [x]Signs/symptoms consistent with pathology which may benefit from Dry needling     []other:     Prognosis/Rehab Potential:      []Excellent   [x]Good    []Fair   []Poor    Tolerance of evaluation/treatment: [] Met: [] Not Met: [] Adjusted    Therapist goals for Patient:   Short Term Goals: To be achieved in: 2 weeks  1. Independent in HEP and progression per patient tolerance, in order to prevent re-injury. [] Progressing: [] Met: [] Not Met: [] Adjusted  2. Patient will have a decrease in pain to facilitate improvement in movement, function, and ADLs as indicated by Functional Deficits. [] Progressing: [] Met: [] Not Met: [] Adjusted    Long Term Goals: To be achieved in: 12 weeks  1. Disability index score of 20% or less for the [facility] to assist with reaching prior level of function. [] Progressing: [] Met: [] Not Met: [] Adjusted  2. Patient will demonstrate increased AROM to 160 flex, T11 IR all painfree to allow for proper joint functioning as indicated by patients Functional Deficits. [] Progressing: [] Met: [] Not Met: [] Adjusted  3. Patient will demonstrate an increase in Strength to 4+/5 pain free to allow for proper functional mobility as indicated by patients Functional Deficits. [] Progressing: [] Met: [] Not Met: [] Adjusted  4. Patient will return to normal lifting and daily activites without increased symptoms or restriction. [] Progressing: [] Met: [] Not Met: [] Adjusted  5. Patient will demonstrate appropriate scapular mechanics with no UT compensation to reduce risk of re-injury.     [] Progressing: [] Met: [] Not Met: [] Adjusted         Electronically signed by:  Hill Mcpherson PT DPT [id number]

## 2020-10-09 ENCOUNTER — HOSPITAL ENCOUNTER (OUTPATIENT)
Dept: PHYSICAL THERAPY | Age: 50
Setting detail: THERAPIES SERIES
Discharge: HOME | End: 2020-10-09
Payer: COMMERCIAL

## 2020-10-09 PROCEDURE — 97110 THERAPEUTIC EXERCISES: CPT | Performed by: PHYSICAL THERAPIST

## 2020-10-09 PROCEDURE — 97140 MANUAL THERAPY 1/> REGIONS: CPT | Performed by: PHYSICAL THERAPIST

## 2020-10-09 NOTE — FLOWSHEET NOTE
[facility], [address] [place]  Phone: [phone]  Fax [phone]    Physical Therapy Daily Treatment Note  Date:  10/9/2020    Patient Name:  Murtaza Funes    :  1970  MRN: [id number]  Restrictions/Precautions:    Physician Information:  Referring Practitioner: Blanca Larios  Medical/Treatment Diagnosis Information:  · Diagnosis: M25.512 (ICD-10-CM) - Left shoulder pain, unspecified chronicity  · Treatment Diagnosis: Left shoulder pain M25.512, left shoulder stiffness M25.612, left shoulder general weakness M62.82      [x] Conservative / [] Surgical - DOS:  Therapy Diagnosis/Practice Pattern:  Practice Pattern E: Localized Inflammation   Insurance/Certification information:  PT Insurance Information:  BCBS - 500D-80/20-$0CP-60PT/OT  Plan of care signed: [x] YES  [] NO  Number of Comorbidities:  []0     [x]1-2    []3+  Date of Patient follow up with Physician:     Is this a Progress Report:     []  Yes  [x]  No        If Yes:  Date Range for reporting period:  Beginning 10/5/2020  Ending     Progress report will be due (10 Rx or 30 days whichever is less):        Recertification will be due (POC Duration  / 90 days whichever is less): 2020     Progress Note: [x]  Yes  []  No  Next due by: Visit #10        Latex Allergy:  [x]NO      []YES  Preferred Language for Healthcare:   [x]English       []other:    Visit # Insurance Allowable Reporting Period   2 60 PT Begin Date: 10/9/2020               End Date:      SUBJECTIVE:  Reprint HEP - missing low trap set with band code is updated   . Feels some of the exercises are challenging while others are easier. Wonders if he is doing them wrong. No change in symptoms or in pain at this time. Holding on yoga especially with weight bearing activities.      OBJECTIVE:    Observation:  Limited ER passively, tight posterior capsule    Palpation: Test used Initial score Current Score   Pain Summary VAS 0-8    Functional questionnaire QDash 48%  32/55    ROM flexion 155 P! 165 P! ER T1 P!     ABD      IR L3 P! Strength flexion 4+ P! ER 4 P!     ABD 4- P! IR 4- P!       RESTRICTIONS/PRECAUTIONS: none    Exercises/Interventions: 1x through for HEP review - 2-3 sets for HEP  Therapeutic Ex Sets/reps Notes HEP   Supine pec stretch  X   Supine snow nkechi  Supine alt UE ext   Unable to achieve ER to perform snow nkechi - held today  Held snow angle   Quadruped thread the needle  Quadruped scap lift 10 x  10 x  X   TB wall slide 10 x  X               Supine posterior capsule stretch 3 x 20\"      Cane ER stretch 10 x 5\"   x   Sleeper stretch  3 x 20\"   x   Supine SA punch 5# 20 x bilat      SL ER 3# 2 x 10 Very fatiguing  x   No Money RTB 20 x   x   Corner stretch  3 x 20\"   x                                 Pt ed: anatomy, PT progression, RICE 8 min     Manual Intervention      PROM, grade 1-3 joint mobs 10 min Focused on inferior glide of GHJ progressed from G1-3                                        NMR re-education                                                        Access Code: [email or web address]   URL: [employer]/   Date: 10/05/2020   Prepared by: Margot Cavazos     Exercises  Supine Chest Stretch on Foam Roll - 2-3 sets - 10 Breaths - 1-2x daily - 7x weekly  Supine Snow nkechi - 10 reps - 2-3 sets - 1-2x daily - 7x weekly  Dead Bug Alternating Arm Extension - 10 reps - 2-3 sets - 1-2x daily - 7x weekly  Quadruped Thoracic Rotation - Reach Under - 10 reps - 2-3 sets - 1-2x daily - 7x weekly  Quadruped Shoulder Flexion with Supination and Lift - 10 reps - 2-3 sets - 1-2x daily - 7x weekly  Standing Low Trap Setting with Resistance at [address] 10 reps - 2-3 sets - 1-2x daily - 7x weekly    Therapeutic Exercise and NMR EXR  [x] (57342) Provided verbal/tactile cueing for activities related to strengthening, flexibility, endurance, ROM  for improvements in scapular, scapulothoracic and UE control with self care, reaching, carrying, lifting, house/yardwork, driving/computer work.    [] (28702) Provided verbal/tactile cueing for activities related to improving balance, coordination, kinesthetic sense, posture, motor skill, proprioception  to assist with  scapular, scapulothoracic and UE control with self care, reaching, carrying, lifting, house/yardwork, driving/computer work. Therapeutic Activities:    [] (09786 or 05864) Provided verbal/tactile cueing for activities related to improving balance, coordination, kinesthetic sense, posture, motor skill, proprioception and motor activation to allow for proper function of scapular, scapulothoracic and UE control with self care, carrying, lifting, driving/computer work.      Home Exercise Program:    [x] (38228) Reviewed/Progressed HEP activities related to strengthening, flexibility, endurance, ROM of scapular, scapulothoracic and UE control with self care, reaching, carrying, lifting, house/yardwork, driving/computer work  [] (47621) Reviewed/Progressed HEP activities related to improving balance, coordination, kinesthetic sense, posture, motor skill, proprioception of scapular, scapulothoracic and UE control with self care, reaching, carrying, lifting, house/yardwork, driving/computer work      Manual Treatments:  PROM / STM / Oscillations-Mobs:  G-I, II, III, IV (PA's, Inf., Post.)  [x] (04656) Provided manual therapy to mobilize soft tissue/joints of cervical/CT, scapular GHJ and UE for the purpose of modulating pain, promoting relaxation,  increasing ROM, reducing/eliminating soft tissue swelling/inflammation/restriction, improving soft tissue extensibility and allowing for proper ROM for normal function with self care, reaching, carrying, lifting, house/yardwork, driving/computer work    Modalities:      [] GR/ESU 15 min    [] GR 15 min   [] ESU     [] CP    [] MHP    [x] declined    Charges:  Timed Progress Update:  [] Patient is progressing as expected towards functional goals listed. [] Progression is slowed due to complexities/Impairments listed. [] Progression has been slowed due to co-morbidities. [x] Plan just implemented, too soon to assess goals progression <30days   [] Goals require adjustment due to lack of progress  [] Patient is not progressing as expected and requires additional follow up with physician  [] Other    Prognosis for POC: [x] Good [] Fair  [] Poor      Patient requires continued skilled intervention: [x] Yes  [] No      ASSESSMENT:  Tightness of rotational movements along with posterior capsule tightness. Fatigued quickly with ER strengthening. HEP updated. Treatment/Activity Tolerance:  [x] Patient tolerated treatment well [] Patient limited by fatique  [] Patient limited by pain  [] Patient limited by other medical complications  [] Other:     Patient Requires Follow-up: [x] Yes  [] No    PLAN: See eval  [x] Continue per plan of care [] Alter current plan (see comments above)  [] Plan of care initiated [] Hold pending MD visit [] Discharge      Electronically signed by:  Hiral Miles, PT , DPT [id number]    Note: If patient does not return for scheduled/ recommended follow up visits, this note will serve as a discharge from care along with most recent update on progress.

## 2020-10-13 ENCOUNTER — HOSPITAL ENCOUNTER (OUTPATIENT)
Dept: PHYSICAL THERAPY | Age: 50
Setting detail: THERAPIES SERIES
Discharge: HOME | End: 2020-10-13
Payer: COMMERCIAL

## 2020-10-13 PROCEDURE — 97140 MANUAL THERAPY 1/> REGIONS: CPT

## 2020-10-13 PROCEDURE — 97110 THERAPEUTIC EXERCISES: CPT

## 2020-10-13 NOTE — FLOWSHEET NOTE
[facility], [address]  Phone: [phone]  Fax [phone]    Physical Therapy Daily Treatment Note  Date:  10/13/2020    Patient Name:  Ralph Kline    :  1970  MRN: [id number]  Restrictions/Precautions:    Physician Information:  Referring Practitioner: Easton Larios  Medical/Treatment Diagnosis Information:  · Diagnosis: M25.512 (ICD-10-CM) - Left shoulder pain, unspecified chronicity  · Treatment Diagnosis: Left shoulder pain M25.512, left shoulder stiffness M25.612, left shoulder general weakness M62.82      [x] Conservative / [] Surgical - DOS:  Therapy Diagnosis/Practice Pattern:  Practice Pattern E: Localized Inflammation   Insurance/Certification information:  PT Insurance Information:  BCBS - 500D-80/20-$[id number]/OT  Plan of care signed: [x] YES  [] NO  Number of Comorbidities:  []0     [x]1-2    []3+  Date of Patient follow up with Physician:     Is this a Progress Report:     []  Yes  [x]  No        If Yes:  Date Range for reporting period:  Beginning 10/5/2020  Ending     Progress report will be due (10 Rx or 30 days whichever is less):        Recertification will be due (POC Duration  / 90 days whichever is less): 2020     Progress Note: [x]  Yes  []  No  Next due by: Visit #10        Latex Allergy:  [x]NO      []YES  Preferred Language for Healthcare:   [x]English       []other:    Visit # Insurance Allowable Reporting Period   3 60 PT Begin Date: 10/13/2020               End Date:      SUBJECTIVE:  Pt reports fatigued after last session. Over all feels he is improving. OBJECTIVE:    Observation:  Limited ER passively, tight posterior capsule    Palpation:     Test used Initial score Current Score   Pain Summary VAS 0-8    Functional questionnaire QDash 48%  32/55    ROM flexion 155 P! 165 P! ER T1 P!     ABD      IR L3 P! Strength flexion 4+ P! ER 4 P! ABD 4- P! IR 4- P!       RESTRICTIONS/PRECAUTIONS: none    Exercises/Interventions: 1x through for HEP review - 2-3 sets for HEP  Therapeutic Ex Sets/reps Notes HEP   Supine pec stretch  X   Supine snow nkechi  Supine alt UE ext   Unable to achieve ER to perform snow nkechi - held today  Held snow angle   Quadruped thread the needle  Quadruped scap lift   X   TB wall slide  TB 3 way on wall 2 x 10  5 x RTB  RTB fatigue X   TB row/ext 2 x 10 GTB    Prone 4 step scap row/ext 2 x 10 each bilat 3#    Supine posterior capsule stretch 3 x 20\"      Cane ER stretch   x   Sleeper stretch  3 x 20\"   x   Supine SA punch 5# 20 x bilat  90/120    SL ER 3# 2 x 10 Very fatiguing  x   No Money RTB 20 x   x                                 Pt ed: anatomy, PT progression, RICE 8 min     Manual Intervention      PROM, grade 1-3 joint mobs 10 min Focused on inferior glide of GHJ progressed from G1-3                                        NMR re-education                                                        Access Code: [email or web address]   URL: [email or web address]   Date: 10/05/2020   Prepared by: Ferrell Primrose     Exercises  Supine Chest Stretch on Foam Roll - 2-3 sets - 10 Breaths - 1-2x daily - 7x weekly  Supine Snow nkechi - 10 reps - 2-3 sets - 1-2x daily - 7x weekly  Dead Bug Alternating Arm Extension - 10 reps - 2-3 sets - 1-2x daily - 7x weekly  Quadruped Thoracic Rotation - Reach Under - 10 reps - 2-3 sets - 1-2x daily - 7x weekly  Quadruped Shoulder Flexion with Supination and Lift - 10 reps - 2-3 sets - 1-2x daily - 7x weekly  Standing Low Trap Setting with Resistance at [address] 10 reps - 2-3 sets - 1-2x daily - 7x weekly    Therapeutic Exercise and NMR EXR  [x] (00238) Provided verbal/tactile cueing for activities related to strengthening, flexibility, endurance, ROM  for improvements in scapular, scapulothoracic and UE control with self care, reaching, carrying, lifting, house/yardwork, driving/computer work.     [] (95916) Provided verbal/tactile cueing for activities related to improving balance, coordination, kinesthetic sense, posture, motor skill, proprioception  to assist with  scapular, scapulothoracic and UE control with self care, reaching, carrying, lifting, house/yardwork, driving/computer work. Therapeutic Activities:    [] (96200 or 58113) Provided verbal/tactile cueing for activities related to improving balance, coordination, kinesthetic sense, posture, motor skill, proprioception and motor activation to allow for proper function of scapular, scapulothoracic and UE control with self care, carrying, lifting, driving/computer work.      Home Exercise Program:    [x] (37449) Reviewed/Progressed HEP activities related to strengthening, flexibility, endurance, ROM of scapular, scapulothoracic and UE control with self care, reaching, carrying, lifting, house/yardwork, driving/computer work  [] (22445) Reviewed/Progressed HEP activities related to improving balance, coordination, kinesthetic sense, posture, motor skill, proprioception of scapular, scapulothoracic and UE control with self care, reaching, carrying, lifting, house/yardwork, driving/computer work      Manual Treatments:  PROM / STM / Oscillations-Mobs:  G-I, II, III, IV (PA's, Inf., Post.)  [x] (61063) Provided manual therapy to mobilize soft tissue/joints of cervical/CT, scapular GHJ and UE for the purpose of modulating pain, promoting relaxation,  increasing ROM, reducing/eliminating soft tissue swelling/inflammation/restriction, improving soft tissue extensibility and allowing for proper ROM for normal function with self care, reaching, carrying, lifting, house/yardwork, driving/computer work    Modalities:      [] GR/ESU 15 min    [] GR 15 min   [] ESU     [] CP    [] MHP    [x] declined    Charges:  Timed Code Treatment Minutes: 40   Total Treatment Minutes: 40     [] EVAL (LOW) 69076 (typically 20 minutes face-to-face)  [] EVAL (MOD) 98653 (typically 30 minutes face-to-face)  [] EVAL (HIGH) 41151 (typically 45 minutes face-to-face)  [] RE-EVAL     [x] MX(33558) x 2     [] IONTO  [] NMR (64547) x    [] VASO  [x] Manual (85486) x 1    [] Other:  [] TA x      [] Mech Traction (24708)  [] ES(attended) (85751)      [] ES (un) (69607):     GOALS: Patient stated goal: Pain free shoulder  [] Progressing: [] Met: [] Not Met: [] Adjusted    Therapist goals for Patient:   Short Term Goals: To be achieved in: 2 weeks  1. Independent in HEP and progression per patient tolerance, in order to prevent re-injury. [] Progressing: [] Met: [] Not Met: [] Adjusted  2. Patient will have a decrease in pain to facilitate improvement in movement, function, and ADLs as indicated by Functional Deficits. [] Progressing: [] Met: [] Not Met: [] Adjusted    Long Term Goals: To be achieved in: 12 weeks  1. Disability index score of 20% or less for the [facility] to assist with reaching prior level of function. [] Progressing: [] Met: [] Not Met: [] Adjusted  2. Patient will demonstrate increased AROM to 160 flex, T11 IR all painfree to allow for proper joint functioning as indicated by patients Functional Deficits. [] Progressing: [] Met: [] Not Met: [] Adjusted  3. Patient will demonstrate an increase in Strength to 4+/5 pain free to allow for proper functional mobility as indicated by patients Functional Deficits. [] Progressing: [] Met: [] Not Met: [] Adjusted  4. Patient will return to normal lifting and daily activites without increased symptoms or restriction. [] Progressing: [] Met: [] Not Met: [] Adjusted  5. Patient will demonstrate appropriate scapular mechanics with no UT compensation to reduce risk of re-injury. [] Progressing: [] Met: [] Not Met: [] Adjusted      Overall Progression Towards Functional goals/ Treatment Progress Update:  [] Patient is progressing as expected towards functional goals listed.     [] Progression is slowed due to complexities/Impairments listed. [] Progression has been slowed due to co-morbidities. [x] Plan just implemented, too soon to assess goals progression <30days   [] Goals require adjustment due to lack of progress  [] Patient is not progressing as expected and requires additional follow up with physician  [] Other    Prognosis for POC: [x] Good [] Fair  [] Poor      Patient requires continued skilled intervention: [x] Yes  [] No      ASSESSMENT:  Tightness of rotational movements along with posterior capsule tightness. Fatigued at end of session. Improving inferior glide of humeral head    Treatment/Activity Tolerance:  [x] Patient tolerated treatment well [] Patient limited by fatique  [] Patient limited by pain  [] Patient limited by other medical complications  [] Other:     Patient Requires Follow-up: [x] Yes  [] No    PLAN: See eval  [x] Continue per plan of care [] Alter current plan (see comments above)  [] Plan of care initiated [] Hold pending MD visit [] Discharge      Electronically signed by:  Margot Cavazos, PT , DPT [id number]      Note: If patient does not return for scheduled/ recommended follow up visits, this note will serve as a discharge from care along with most recent update on progress.

## 2020-10-15 ENCOUNTER — HOSPITAL ENCOUNTER (OUTPATIENT)
Dept: PHYSICAL THERAPY | Age: 50
Setting detail: THERAPIES SERIES
Discharge: HOME | End: 2020-10-15
Payer: COMMERCIAL

## 2020-10-15 PROCEDURE — 97140 MANUAL THERAPY 1/> REGIONS: CPT

## 2020-10-15 PROCEDURE — 97110 THERAPEUTIC EXERCISES: CPT

## 2020-10-15 NOTE — FLOWSHEET NOTE
[facility],  [address] [place]  Phone: [phone]  Fax [phone]    Physical Therapy Daily Treatment Note  Date:  10/15/2020    Patient Name:  Ishan Ralph    :  1970  MRN: [id number]  Restrictions/Precautions:    Physician Information:  Referring Practitioner: Marito Larios  Medical/Treatment Diagnosis Information:  · Diagnosis: M25.512 (ICD-10-CM) - Left shoulder pain, unspecified chronicity  · Treatment Diagnosis: Left shoulder pain M25.512, left shoulder stiffness M25.612, left shoulder general weakness M62.82      [x] Conservative / [] Surgical - DOS:  Therapy Diagnosis/Practice Pattern:  Practice Pattern E: Localized Inflammation   Insurance/Certification information:  PT Insurance Information:  BCBS - [id number]/20-$[id number]/OT  Plan of care signed: [x] YES  [] NO  Number of Comorbidities:  []0     [x]1-2    []3+  Date of Patient follow up with Physician:     Is this a Progress Report:     []  Yes  [x]  No        If Yes:  Date Range for reporting period:  Beginning 10/5/2020  Ending     Progress report will be due (10 Rx or 30 days whichever is less):        Recertification will be due (POC Duration  / 90 days whichever is less): 2020     Progress Note: [x]  Yes  []  No  Next due by: Visit #10        Latex Allergy:  [x]NO      []YES  Preferred Language for Healthcare:   [x]English       []other:    Visit # Insurance Allowable Reporting Period   4 60 PT Begin Date: 10/15/2020               End Date:      SUBJECTIVE:  Pt reports a little sore after last session but improving daily. OBJECTIVE:    Observation:  Limited ER passively, tight posterior capsule    Palpation:     Test used Initial score Current Score   Pain Summary VAS 0-8    Functional questionnaire QDash 48%  32/55    ROM flexion 155 P! 165 P! ER T1 P!     ABD      IR L3 P! Strength flexion 4+ P!      ER 4 P!     ABD [] EVAL (LOW) 95209 (typically 20 minutes face-to-face)  [] EVAL (MOD) 87541 (typically 30 minutes face-to-face)  [] EVAL (HIGH) 43765 (typically 45 minutes face-to-face)  [] RE-EVAL     [x] VZ(87224) x 2     [] IONTO  [] NMR (61121) x    [] VASO  [x] Manual (19916) x 1    [] Other:  [] TA x      [] Mech Traction (71653)  [] ES(attended) (76334)      [] ES (un) (55674):     GOALS: Patient stated goal: Pain free shoulder  [] Progressing: [] Met: [] Not Met: [] Adjusted    Therapist goals for Patient:   Short Term Goals: To be achieved in: 2 weeks  1. Independent in HEP and progression per patient tolerance, in order to prevent re-injury. [] Progressing: [] Met: [] Not Met: [] Adjusted  2. Patient will have a decrease in pain to facilitate improvement in movement, function, and ADLs as indicated by Functional Deficits. [] Progressing: [] Met: [] Not Met: [] Adjusted    Long Term Goals: To be achieved in: 12 weeks  1. Disability index score of 20% or less for the [facility] to assist with reaching prior level of function. [] Progressing: [] Met: [] Not Met: [] Adjusted  2. Patient will demonstrate increased AROM to 160 flex, T11 IR all painfree to allow for proper joint functioning as indicated by patients Functional Deficits. [] Progressing: [] Met: [] Not Met: [] Adjusted  3. Patient will demonstrate an increase in Strength to 4+/5 pain free to allow for proper functional mobility as indicated by patients Functional Deficits. [] Progressing: [] Met: [] Not Met: [] Adjusted  4. Patient will return to normal lifting and daily activites without increased symptoms or restriction. [] Progressing: [] Met: [] Not Met: [] Adjusted  5. Patient will demonstrate appropriate scapular mechanics with no UT compensation to reduce risk of re-injury.     [] Progressing: [] Met: [] Not Met: [] Adjusted      Overall Progression Towards Functional goals/ Treatment Progress Update:  [] Patient is progressing as expected towards functional goals listed. [] Progression is slowed due to complexities/Impairments listed. [] Progression has been slowed due to co-morbidities. [x] Plan just implemented, too soon to assess goals progression <30days   [] Goals require adjustment due to lack of progress  [] Patient is not progressing as expected and requires additional follow up with physician  [] Other    Prognosis for POC: [x] Good [] Fair  [] Poor      Patient requires continued skilled intervention: [x] Yes  [] No      ASSESSMENT:  Tightness of rotational movements along with posterior capsule tightness. Fatigued at end of session. Improving inferior glide of humeral head    Treatment/Activity Tolerance:  [x] Patient tolerated treatment well [] Patient limited by fatique  [] Patient limited by pain  [] Patient limited by other medical complications  [] Other:     Patient Requires Follow-up: [x] Yes  [] No    PLAN: See eval  [x] Continue per plan of care [] Alter current plan (see comments above)  [] Plan of care initiated [] Hold pending MD visit [] Discharge      Electronically signed by:  Augusto Cruz, PT , DPT [id number]      Note: If patient does not return for scheduled/ recommended follow up visits, this note will serve as a discharge from care along with most recent update on progress.

## 2020-10-20 ENCOUNTER — HOSPITAL ENCOUNTER (OUTPATIENT)
Dept: PHYSICAL THERAPY | Age: 50
Setting detail: THERAPIES SERIES
Discharge: HOME | End: 2020-10-20
Payer: COMMERCIAL

## 2020-10-20 PROCEDURE — 97140 MANUAL THERAPY 1/> REGIONS: CPT

## 2020-10-20 PROCEDURE — 97110 THERAPEUTIC EXERCISES: CPT

## 2020-10-20 NOTE — FLOWSHEET NOTE
[facility] [facility], [address]  Phone: [phone]  Fax [phone]    Physical Therapy Daily Treatment Note  Date:  10/20/2020    Patient Name:  Luis Weaver    :  1970  MRN: [id number]  Restrictions/Precautions:    Physician Information:  Referring Practitioner: Filomena Larios  Medical/Treatment Diagnosis Information:  · Diagnosis: M25.512 (ICD-10-CM) - Left shoulder pain, unspecified chronicity  · Treatment Diagnosis: Left shoulder pain M25.512, left shoulder stiffness M25.612, left shoulder general weakness M62.82      [x] Conservative / [] Surgical - DOS:  Therapy Diagnosis/Practice Pattern:  Practice Pattern E: Localized Inflammation   Insurance/Certification information:  PT Insurance Information:  BCBS - [id number]/OT  Plan of care signed: [x] YES  [] NO  Number of Comorbidities:  []0     [x]1-2    []3+  Date of Patient follow up with Physician:     Is this a Progress Report:     []  Yes  [x]  No        If Yes:  Date Range for reporting period:  Beginning 10/5/2020  Ending     Progress report will be due (10 Rx or 30 days whichever is less):        Recertification will be due (POC Duration  / 90 days whichever is less): 2020     Progress Note: [x]  Yes  []  No  Next due by: Visit #10        Latex Allergy:  [x]NO      []YES  Preferred Language for Healthcare:   [x]English       []other:    Visit # Insurance Allowable Reporting Period   5 60 PT Begin Date: 10/20/2020               End Date:      SUBJECTIVE:  Pt reports shoulder improving. No pain on run over weekend. Feels pretty good with most activities. Still feels it at night when sleeping in certain positions.  Still trouble with getting jacket on if not thinking about it    OBJECTIVE:    Observation:  Limited ER passively, tight posterior capsule    Palpation:     Test used Initial score Current Score  10/20/2020   Pain work    Modalities:      [] GR/ESU 15 min    [] GR 15 min   [] ESU     [] CP    [] MHP    [x] declined    Charges:  Timed Code Treatment Minutes: 40   Total Treatment Minutes: 40     [] EVAL (LOW) 79520 (typically 20 minutes face-to-face)  [] EVAL (MOD) 79659 (typically 30 minutes face-to-face)  [] EVAL (HIGH) 89408 (typically 45 minutes face-to-face)  [] RE-EVAL     [x] BD(46623) x 2     [] IONTO  [] NMR (89191) x    [] VASO  [x] Manual (98936) x 1    [] Other:  [] TA x      [] Mech Traction (21364)  [] ES(attended) (83946)      [] ES (un) (79099):     GOALS: Patient stated goal: Pain free shoulder  [] Progressing: [] Met: [] Not Met: [] Adjusted    Therapist goals for Patient:   Short Term Goals: To be achieved in: 2 weeks  1. Independent in HEP and progression per patient tolerance, in order to prevent re-injury. [] Progressing: [] Met: [] Not Met: [] Adjusted  2. Patient will have a decrease in pain to facilitate improvement in movement, function, and ADLs as indicated by Functional Deficits. [] Progressing: [] Met: [] Not Met: [] Adjusted    Long Term Goals: To be achieved in: 12 weeks  1. Disability index score of 20% or less for the [facility] to assist with reaching prior level of function. [] Progressing: [] Met: [] Not Met: [] Adjusted  2. Patient will demonstrate increased AROM to 160 flex, T11 IR all painfree to allow for proper joint functioning as indicated by patients Functional Deficits. [] Progressing: [] Met: [] Not Met: [] Adjusted  3. Patient will demonstrate an increase in Strength to 4+/5 pain free to allow for proper functional mobility as indicated by patients Functional Deficits. [] Progressing: [] Met: [] Not Met: [] Adjusted  4. Patient will return to normal lifting and daily activites without increased symptoms or restriction. [] Progressing: [] Met: [] Not Met: [] Adjusted  5.  Patient will demonstrate appropriate scapular mechanics with no UT compensation to reduce risk of re-injury. [] Progressing: [] Met: [] Not Met: [] Adjusted      Overall Progression Towards Functional goals/ Treatment Progress Update:  [] Patient is progressing as expected towards functional goals listed. [] Progression is slowed due to complexities/Impairments listed. [] Progression has been slowed due to co-morbidities. [x] Plan just implemented, too soon to assess goals progression <30days   [] Goals require adjustment due to lack of progress  [] Patient is not progressing as expected and requires additional follow up with physician  [] Other    Prognosis for POC: [x] Good [] Fair  [] Poor      Patient requires continued skilled intervention: [x] Yes  [] No      ASSESSMENT:  Tightness of rotational movements along with posterior capsule tightness. Improving inferior glide. Improved AROM, still painful and stiff feeling. Treatment/Activity Tolerance:  [x] Patient tolerated treatment well [] Patient limited by fatique  [] Patient limited by pain  [] Patient limited by other medical complications  [] Other:     Patient Requires Follow-up: [x] Yes  [] No    PLAN: See eval  [x] Continue per plan of care [] Alter current plan (see comments above)  [] Plan of care initiated [] Hold pending MD visit [] Discharge      Electronically signed by:  Jennifer Mejía PT , DPT [id number]      Note: If patient does not return for scheduled/ recommended follow up visits, this note will serve as a discharge from care along with most recent update on progress.

## 2020-10-22 ENCOUNTER — HOSPITAL ENCOUNTER (OUTPATIENT)
Dept: PHYSICAL THERAPY | Age: 50
Setting detail: THERAPIES SERIES
Discharge: HOME | End: 2020-10-22
Payer: COMMERCIAL

## 2020-10-22 PROCEDURE — 97110 THERAPEUTIC EXERCISES: CPT

## 2020-10-22 PROCEDURE — 97140 MANUAL THERAPY 1/> REGIONS: CPT

## 2020-10-22 NOTE — FLOWSHEET NOTE
[facility],  [address]  Phone: [phone]  Fax [phone]    Physical Therapy Daily Treatment Note  Date:  10/22/2020    Patient Name:  Mitch Diaz    :  1970  MRN: [id number]  Restrictions/Precautions:    Physician Information:  Referring Practitioner: Vida Larios  Medical/Treatment Diagnosis Information:  · Diagnosis: M25.512 (ICD-10-CM) - Left shoulder pain, unspecified chronicity  · Treatment Diagnosis: Left shoulder pain M25.512, left shoulder stiffness M25.612, left shoulder general weakness M62.82      [x] Conservative / [] Surgical - DOS:  Therapy Diagnosis/Practice Pattern:  Practice Pattern E: Localized Inflammation   Insurance/Certification information:  PT Insurance Information:  BCBS - 500D-80/20-$[id number]-60PT/OT  Plan of care signed: [x] YES  [] NO  Number of Comorbidities:  []0     [x]1-2    []3+  Date of Patient follow up with Physician:     Is this a Progress Report:     []  Yes  [x]  No        If Yes:  Date Range for reporting period:  Beginning 10/5/2020  Ending     Progress report will be due (10 Rx or 30 days whichever is less): 8429       Recertification will be due (POC Duration  / 90 days whichever is less): 2020     Progress Note: [x]  Yes  []  No  Next due by: Visit #10        Latex Allergy:  [x]NO      []YES  Preferred Language for Healthcare:   [x]English       []other:    Visit # Insurance Allowable Reporting Period   6 60 PT Begin Date: 10/22/2020               End Date:      SUBJECTIVE:  Pt reports he is really noticing improvements, pain is less frequent and less severe/    OBJECTIVE:    Observation:  Limited ER passively, tight posterior capsule    Palpation:     Test used Initial score Current Score  10/20/2020   Pain Summary VAS 0-8 0-5   Functional questionnaire QDash 48%  32/55    ROM flexion 155 P! 175 P! ER T1 P! T3 P!    ABD      IR L3 P! T12 P! Strength flexion 4+ P! ER 4 P!     ABD 4- P! IR 4- P!       RESTRICTIONS/PRECAUTIONS: none    Exercises/Interventions: bilateral unless noted otherwise  Therapeutic Ex Sets/reps Notes HEP   Supine pec stretch  X   Supine snow nkechi  Supine alt UE ext   Unable to achieve ER to perform snow nkechi - held today  Held snow angle   Quadruped thread the needle  Quadruped scap lift   X   TB wall slide with low trap set lift  TB 3 way on wall  DB scaption 2 x 10  2 x 10 5# OVL  RTB fatigue X   CC single arm row  CC double arm row  CC straight arm lat pulldown  TB ER/walkout  TB IR/walkout 2 x 10  2 x 10  2 x 10  10 x/2x10  10 x/2x10 20#  40#  40#  GTB  GTB    Prone over SB row/ext 2 x 10 each  5#    Supine posterior capsule stretch 3 x 30\"      Cane ER stretch   x   Sleeper stretch    x   Supine SA punch 5# 20 x bilat  90/120    SL ER 3# 2 x 15   x   No Money OVL 20 x   x   IR strap stretch 15 x 5\"     Bicep curl hammer/sup 2 x 10 8#                       Pt ed: anatomy, PT progression, RICE      Manual Intervention      PROM, grade 1-3 joint mobs 15 min Focused on inferior glide of GHJ progressed from G1-3                                        NMR re-education                                                        Access Code: [email or web address]   URL: [email or web address]   Date: 10/05/2020   Prepared by: Casey Du     Exercises  Supine Chest Stretch on Foam Roll - 2-3 sets - 10 Breaths - 1-2x daily - 7x weekly  Supine Snow nkechi - 10 reps - 2-3 sets - 1-2x daily - 7x weekly  Dead Bug Alternating Arm Extension - 10 reps - 2-3 sets - 1-2x daily - 7x weekly  Quadruped Thoracic Rotation - Reach Under - 10 reps - 2-3 sets - 1-2x daily - 7x weekly  Quadruped Shoulder Flexion with Supination and Lift - 10 reps - 2-3 sets - 1-2x daily - 7x weekly  Standing Low Trap Setting with Resistance at [address] [facility] [address] 10 reps - 2-3 sets - 1-2x daily - 7x weekly    Therapeutic Exercise and NMR EXR  [x] (49890) Provided verbal/tactile cueing for activities related to strengthening, flexibility, endurance, ROM  for improvements in scapular, scapulothoracic and UE control with self care, reaching, carrying, lifting, house/yardwork, driving/computer work.    [] (25526) Provided verbal/tactile cueing for activities related to improving balance, coordination, kinesthetic sense, posture, motor skill, proprioception  to assist with  scapular, scapulothoracic and UE control with self care, reaching, carrying, lifting, house/yardwork, driving/computer work. Therapeutic Activities:    [] (39642 or 05190) Provided verbal/tactile cueing for activities related to improving balance, coordination, kinesthetic sense, posture, motor skill, proprioception and motor activation to allow for proper function of scapular, scapulothoracic and UE control with self care, carrying, lifting, driving/computer work.      Home Exercise Program:    [x] (12389) Reviewed/Progressed HEP activities related to strengthening, flexibility, endurance, ROM of scapular, scapulothoracic and UE control with self care, reaching, carrying, lifting, house/yardwork, driving/computer work  [] (27869) Reviewed/Progressed HEP activities related to improving balance, coordination, kinesthetic sense, posture, motor skill, proprioception of scapular, scapulothoracic and UE control with self care, reaching, carrying, lifting, house/yardwork, driving/computer work      Manual Treatments:  PROM / STM / Oscillations-Mobs:  G-I, II, III, IV (PA's, Inf., Post.)  [x] (22923) Provided manual therapy to mobilize soft tissue/joints of cervical/CT, scapular GHJ and UE for the purpose of modulating pain, promoting relaxation,  increasing ROM, reducing/eliminating soft tissue swelling/inflammation/restriction, improving soft tissue extensibility and allowing for proper ROM for normal function with self care, reaching, carrying, lifting, house/yardwork, driving/computer work    Modalities:      [] GR/ESU 15 min [] GR 15 min   [] ESU     [] CP    [] MHP    [x] declined    Charges:  Timed Code Treatment Minutes: 40   Total Treatment Minutes: 40     [] EVAL (LOW) 84717 (typically 20 minutes face-to-face)  [] EVAL (MOD) 08728 (typically 30 minutes face-to-face)  [] EVAL (HIGH) 28333 (typically 45 minutes face-to-face)  [] RE-EVAL     [x] EF(48255) x 2     [] IONTO  [] NMR (76101) x    [] VASO  [x] Manual (94186) x 1    [] Other:  [] TA x      [] Mech Traction (18785)  [] ES(attended) (30859)      [] ES (un) (99127):     GOALS: Patient stated goal: Pain free shoulder  [] Progressing: [] Met: [] Not Met: [] Adjusted    Therapist goals for Patient:   Short Term Goals: To be achieved in: 2 weeks  1. Independent in HEP and progression per patient tolerance, in order to prevent re-injury. [] Progressing: [] Met: [] Not Met: [] Adjusted  2. Patient will have a decrease in pain to facilitate improvement in movement, function, and ADLs as indicated by Functional Deficits. [] Progressing: [] Met: [] Not Met: [] Adjusted    Long Term Goals: To be achieved in: 12 weeks  1. Disability index score of 20% or less for the [facility] to assist with reaching prior level of function. [] Progressing: [] Met: [] Not Met: [] Adjusted  2. Patient will demonstrate increased AROM to 160 flex, T11 IR all painfree to allow for proper joint functioning as indicated by patients Functional Deficits. [] Progressing: [] Met: [] Not Met: [] Adjusted  3. Patient will demonstrate an increase in Strength to 4+/5 pain free to allow for proper functional mobility as indicated by patients Functional Deficits. [] Progressing: [] Met: [] Not Met: [] Adjusted  4. Patient will return to normal lifting and daily activites without increased symptoms or restriction. [] Progressing: [] Met: [] Not Met: [] Adjusted  5. Patient will demonstrate appropriate scapular mechanics with no UT compensation to reduce risk of re-injury.     [] Progressing: [] Met: [] Not Met: [] Adjusted      Overall Progression Towards Functional goals/ Treatment Progress Update:  [] Patient is progressing as expected towards functional goals listed. [] Progression is slowed due to complexities/Impairments listed. [] Progression has been slowed due to co-morbidities. [x] Plan just implemented, too soon to assess goals progression <30days   [] Goals require adjustment due to lack of progress  [] Patient is not progressing as expected and requires additional follow up with physician  [] Other    Prognosis for POC: [x] Good [] Fair  [] Poor      Patient requires continued skilled intervention: [x] Yes  [] No      ASSESSMENT:  Tightness of rotational movements along with posterior capsule tightness. Improving inferior glide. Improved AROM, still painful and stiff feeling. Treatment/Activity Tolerance:  [x] Patient tolerated treatment well [] Patient limited by fatique  [] Patient limited by pain  [] Patient limited by other medical complications  [] Other:     Patient Requires Follow-up: [x] Yes  [] No    PLAN: See eval  [x] Continue per plan of care [] Alter current plan (see comments above)  [] Plan of care initiated [] Hold pending MD visit [] Discharge      Electronically signed by:  Eden Matthews PT , DPT [id number]      Note: If patient does not return for scheduled/ recommended follow up visits, this note will serve as a discharge from care along with most recent update on progress.

## 2020-10-29 ENCOUNTER — HOSPITAL ENCOUNTER (OUTPATIENT)
Dept: PHYSICAL THERAPY | Age: 50
Setting detail: THERAPIES SERIES
Discharge: HOME | End: 2020-10-29
Payer: COMMERCIAL

## 2020-10-29 PROCEDURE — 97140 MANUAL THERAPY 1/> REGIONS: CPT

## 2020-10-29 PROCEDURE — 97110 THERAPEUTIC EXERCISES: CPT

## 2020-10-29 NOTE — FLOWSHEET NOTE
[facility],  [address] [place]  Phone: [phone]  Fax [phone]    Physical Therapy Daily Treatment Note  Date:  10/29/2020    Patient Name:  Mildred Johnson    :  1970  MRN: [id number]  Restrictions/Precautions:    Physician Information:  Referring Practitioner: Ziggy Larios  Medical/Treatment Diagnosis Information:  · Diagnosis: M25.512 (ICD-10-CM) - Left shoulder pain, unspecified chronicity  · Treatment Diagnosis: Left shoulder pain M25.512, left shoulder stiffness M25.612, left shoulder general weakness M62.82      [x] Conservative / [] Surgical - DOS:  Therapy Diagnosis/Practice Pattern:  Practice Pattern E: Localized Inflammation   Insurance/Certification information:  PT Insurance Information:  BCBS - 500D-80/20-$[id number]-60PT/OT  Plan of care signed: [x] YES  [] NO  Number of Comorbidities:  []0     [x]1-2    []3+  Date of Patient follow up with Physician:     Is this a Progress Report:     []  Yes  [x]  No        If Yes:  Date Range for reporting period:  Beginning 10/5/2020  Ending     Progress report will be due (10 Rx or 30 days whichever is less): 4403       Recertification will be due (POC Duration  / 90 days whichever is less): 2020     Progress Note: [x]  Yes  []  No  Next due by: Visit #10        Latex Allergy:  [x]NO      []YES  Preferred Language for Healthcare:   [x]English       []other:    Visit # Insurance Allowable Reporting Period   7 60 PT Begin Date: 10/29/2020               End Date:      SUBJECTIVE:  Pt reports shoulder feeling pretty good today. OBJECTIVE:    Observation:  Limited ER passively, tight posterior capsule    Palpation:     Test used Initial score Current Score  10/20/2020   Pain Summary VAS 0-8 0-5   Functional questionnaire QDash 48%  32/55    ROM flexion 155 P! 175 P! ER T1 P! T3 P!    ABD      IR L3 P! T12 P! Strength flexion 4+ P! ER 4 P! ABD 4- P! IR 4- P!       RESTRICTIONS/PRECAUTIONS: none    Exercises/Interventions: bilateral unless noted otherwise  Therapeutic Ex Sets/reps Notes HEP   Supine pec stretch  X   Supine snow nkechi  Supine alt UE ext   Unable to achieve ER to perform snow nkechi - held today  Held snow angle   Quadruped thread the needle  Quadruped scap lift   X   TB wall slide with low trap set lift  TB 3 way on wall  DB scaption 2 x 10  2 x 10 5# OVL  RTB fatigue X   CC single arm row  CC double arm row  CC straight arm lat pulldown  TB ER/walkout  TB IR/walkout 2 x 10  2 x 10  2 x 10  15 x/2x10  15 x/2x10 25#  50#  40#  GTB  GTB    Prone over SB row/ext 2 x 10 each  7#    Supine posterior capsule stretch 3 x 30\"      Cane ER stretch   x   Sleeper stretch    x   Supine SA punch 5# 20 x bilat  90/120    SL ER 5# 2 x 15   x   No Money OVL 20 x   x   IR strap stretch 15 x 5\"     Bicep curl hammer/sup 2 x 10 10#                       Pt ed: anatomy, PT progression, RICE      Manual Intervention      PROM, grade 1-3 joint mobs 15 min Focused on inferior glide of GHJ progressed from G1-3                                        NMR re-education                                                        Access Code: [email or web address]   URL: [email or web address]   Date: 10/05/2020   Prepared by: Lyric Mena     Exercises  Supine Chest Stretch on Foam Roll - 2-3 sets - 10 Breaths - 1-2x daily - 7x weekly  Supine Snow nkechi - 10 reps - 2-3 sets - 1-2x daily - 7x weekly  Dead Bug Alternating Arm Extension - 10 reps - 2-3 sets - 1-2x daily - 7x weekly  Quadruped Thoracic Rotation - Reach Under - 10 reps - 2-3 sets - 1-2x daily - 7x weekly  Quadruped Shoulder Flexion with Supination and Lift - 10 reps - 2-3 sets - 1-2x daily - 7x weekly  Standing Low Trap Setting with Resistance at [address] [facility] [address] [facility] [address] 10 reps - 2-3 sets - 1-2x daily - 7x weekly    Therapeutic Exercise and NMR EXR  [x] (52754) Provided verbal/tactile cueing for activities related to strengthening, flexibility, endurance, ROM  for improvements in scapular, scapulothoracic and UE control with self care, reaching, carrying, lifting, house/yardwork, driving/computer work.    [] (51984) Provided verbal/tactile cueing for activities related to improving balance, coordination, kinesthetic sense, posture, motor skill, proprioception  to assist with  scapular, scapulothoracic and UE control with self care, reaching, carrying, lifting, house/yardwork, driving/computer work. Therapeutic Activities:    [] (93926 or 79500) Provided verbal/tactile cueing for activities related to improving balance, coordination, kinesthetic sense, posture, motor skill, proprioception and motor activation to allow for proper function of scapular, scapulothoracic and UE control with self care, carrying, lifting, driving/computer work.      Home Exercise Program:    [x] (31314) Reviewed/Progressed HEP activities related to strengthening, flexibility, endurance, ROM of scapular, scapulothoracic and UE control with self care, reaching, carrying, lifting, house/yardwork, driving/computer work  [] (10692) Reviewed/Progressed HEP activities related to improving balance, coordination, kinesthetic sense, posture, motor skill, proprioception of scapular, scapulothoracic and UE control with self care, reaching, carrying, lifting, house/yardwork, driving/computer work      Manual Treatments:  PROM / STM / Oscillations-Mobs:  G-I, II, III, IV (PA's, Inf., Post.)  [x] (25131) Provided manual therapy to mobilize soft tissue/joints of cervical/CT, scapular GHJ and UE for the purpose of modulating pain, promoting relaxation,  increasing ROM, reducing/eliminating soft tissue swelling/inflammation/restriction, improving soft tissue extensibility and allowing for proper ROM for normal function with self care, reaching, carrying, lifting, house/yardwork, driving/computer work    Modalities:      [] GR/ESU 15 min    [] GR 15 min   [] ESU     [] CP    [] MHP    [x] declined    Charges:  Timed Code Treatment Minutes: 40   Total Treatment Minutes: 40     [] EVAL (LOW) 52364 (typically 20 minutes face-to-face)  [] EVAL (MOD) 88786 (typically 30 minutes face-to-face)  [] EVAL (HIGH) 21631 (typically 45 minutes face-to-face)  [] RE-EVAL     [x] IC(22545) x 2     [] IONTO  [] NMR (16547) x    [] VASO  [x] Manual (67036) x 1    [] Other:  [] TA x      [] Mech Traction (35263)  [] ES(attended) (56522)      [] ES (un) (72520):     GOALS: Patient stated goal: Pain free shoulder  [] Progressing: [] Met: [] Not Met: [] Adjusted    Therapist goals for Patient:   Short Term Goals: To be achieved in: 2 weeks  1. Independent in HEP and progression per patient tolerance, in order to prevent re-injury. [] Progressing: [x] Met: [] Not Met: [] Adjusted  2. Patient will have a decrease in pain to facilitate improvement in movement, function, and ADLs as indicated by Functional Deficits. [] Progressing: [x] Met: [] Not Met: [] Adjusted    Long Term Goals: To be achieved in: 12 weeks  1. Disability index score of 20% or less for the [facility] to assist with reaching prior level of function. [x] Progressing: [] Met: [] Not Met: [] Adjusted  2. Patient will demonstrate increased AROM to 160 flex, T11 IR all painfree to allow for proper joint functioning as indicated by patients Functional Deficits. [] Progressing: [x] Met: [] Not Met: [] Adjusted  3. Patient will demonstrate an increase in Strength to 4+/5 pain free to allow for proper functional mobility as indicated by patients Functional Deficits. [x] Progressing: [] Met: [] Not Met: [] Adjusted  4. Patient will return to normal lifting and daily activites without increased symptoms or restriction. [x] Progressing: [] Met: [] Not Met: [] Adjusted  5. Patient will demonstrate appropriate scapular mechanics with no UT compensation to reduce risk of re-injury.     [] Progressing: [x] Met: [] Not Met: [] Adjusted      Overall Progression Towards Functional goals/ Treatment Progress Update:  [x] Patient is progressing as expected towards functional goals listed. [] Progression is slowed due to complexities/Impairments listed. [] Progression has been slowed due to co-morbidities. [] Plan just implemented, too soon to assess goals progression <30days   [] Goals require adjustment due to lack of progress  [] Patient is not progressing as expected and requires additional follow up with physician  [] Other    Prognosis for POC: [x] Good [] Fair  [] Poor      Patient requires continued skilled intervention: [x] Yes  [] No      ASSESSMENT:  Tightness of rotational movements along with posterior capsule tightness. Improving inferior glide. Improved AROM, still painful and stiff feeling. Treatment/Activity Tolerance:  [x] Patient tolerated treatment well [] Patient limited by fatique  [] Patient limited by pain  [] Patient limited by other medical complications  [] Other:     Patient Requires Follow-up: [x] Yes  [] No    PLAN: See eval  [x] Continue per plan of care [] Alter current plan (see comments above)  [] Plan of care initiated [] Hold pending MD visit [] Discharge      Electronically signed by:  Soo Shell, PT , DPT [id number]      Note: If patient does not return for scheduled/ recommended follow up visits, this note will serve as a discharge from care along with most recent update on progress.

## 2020-11-05 ENCOUNTER — HOSPITAL ENCOUNTER (OUTPATIENT)
Dept: PHYSICAL THERAPY | Age: 50
Setting detail: THERAPIES SERIES
Discharge: HOME | End: 2020-11-05
Payer: COMMERCIAL

## 2020-11-05 PROCEDURE — 97110 THERAPEUTIC EXERCISES: CPT

## 2020-11-05 PROCEDURE — 97140 MANUAL THERAPY 1/> REGIONS: CPT

## 2020-11-05 NOTE — PROGRESS NOTES
[facility],  [address]  Phone: [phone]  Fax [phone]    Physical Therapy Progress Report/Daily Treatment Note  Date:  2020    Patient Name:  Lavern Calvert    :  1970  MRN: [id number]  Restrictions/Precautions:    Physician Information:  Referring Practitioner: Kevyn Larios  Medical/Treatment Diagnosis Information:  · Diagnosis: M25.512 (ICD-10-CM) - Left shoulder pain, unspecified chronicity  · Treatment Diagnosis: Left shoulder pain M25.512, left shoulder stiffness M25.612, left shoulder general weakness M62.82      [x] Conservative / [] Surgical - DOS:  Therapy Diagnosis/Practice Pattern:  Practice Pattern E: Localized Inflammation   Insurance/Certification information:  PT Insurance Information:  BCBS - 500D-80/20-$[id number]-60PT/OT  Plan of care signed: [x] YES  [] NO  Number of Comorbidities:  []0     [x]1-2    []3+  Date of Patient follow up with Physician:     Is this a Progress Report:     []  Yes  [x]  No        If Yes:  Date Range for reporting period:  Beginning 10/5/2020  Ending     Progress report will be due (10 Rx or 30 days whichever is less):        Recertification will be due (POC Duration  / 90 days whichever is less): 2020     Progress Note: [x]  Yes  []  No  Next due by: Visit #10        Latex Allergy:  [x]NO      []YES  Preferred Language for Healthcare:   [x]English       []other:    Visit # Insurance Allowable Reporting Period   8 60 PT Begin Date: 2020               End Date:      SUBJECTIVE:  Pt reports shoulder continues to improve, not having as severe of pain, feels like he is at least 80% improved    OBJECTIVE:    Observation:  Limited ER passively, tight posterior capsule    Palpation:     Test used Initial score Current Score  2020   Pain Summary VAS 0-8 0-5   Functional questionnaire QDash 48%  32/55 20%  20/55   ROM flexion 155 P! 170 ER T1 P! T2    ABD      IR L3 P! T12 P! Strength flexion 4+ P! 4+    ER 4 P! 4    ABD 4- P! 4    IR 4- P! 4      RESTRICTIONS/PRECAUTIONS: none    Exercises/Interventions: bilateral unless noted otherwise  Therapeutic Ex Sets/reps Notes HEP   Supine pec stretch  X   Supine snow nkechi  Supine alt UE ext   Unable to achieve ER to perform snow nkechi - held today  Held snow angle   Quadruped thread the needle  Quadruped scap lift   X   TB wall slide with low trap set lift  TB 3 way on wall  DB scaption 2 x 10  2 x 10 5# OVL  RTB fatigue X   CC single arm row  CC double arm row  CC straight arm lat pulldown  TB ER/walkout  TB IR/walkout 2 x 10  2 x 10  2 x 10  15 x/2x10  15 x/2x10 25#  50#  40#  GTB  GTB    Prone over SB row/ext 2 x 10 each  7#    Supine posterior capsule stretch 3 x 30\"      Cane ER stretch   x   Sleeper stretch  3 x 30\"  x   Supine SA punch 5# 20 x bilat  90/120    SL ER 5# 2 x 15   x   No Money    x   IR strap stretch 3 x 30\"     Bicep curl hammer/sup                        Pt ed: anatomy, PT progression, RICE      Manual Intervention      PROM, grade 1-3 joint mobs 15 min Focused on inferior glide of GHJ progressed from G1-3                                        NMR re-education                                                        Access Code: [email or web address]   URL: [email or web address]   Date: 10/05/2020   Prepared by: Tony Abhilash     Exercises  Supine Chest Stretch on Foam Roll - 2-3 sets - 10 Breaths - 1-2x daily - 7x weekly  Supine Snow nkechi - 10 reps - 2-3 sets - 1-2x daily - 7x weekly  Dead Bug Alternating Arm Extension - 10 reps - 2-3 sets - 1-2x daily - 7x weekly  Quadruped Thoracic Rotation - Reach Under - 10 reps - 2-3 sets - 1-2x daily - 7x weekly  Quadruped Shoulder Flexion with Supination and Lift - 10 reps - 2-3 sets - 1-2x daily - 7x weekly  Standing Low Trap Setting with Resistance at [place] - 10 reps - 2-3 sets - 1-2x daily - 7x weekly    Therapeutic Exercise and NMR EXR  [x] (56396) Provided verbal/tactile cueing for activities related to strengthening, flexibility, endurance, ROM  for improvements in scapular, scapulothoracic and UE control with self care, reaching, carrying, lifting, house/yardwork, driving/computer work.    [] (79349) Provided verbal/tactile cueing for activities related to improving balance, coordination, kinesthetic sense, posture, motor skill, proprioception  to assist with  scapular, scapulothoracic and UE control with self care, reaching, carrying, lifting, house/yardwork, driving/computer work. Therapeutic Activities:    [] (46511 or 21660) Provided verbal/tactile cueing for activities related to improving balance, coordination, kinesthetic sense, posture, motor skill, proprioception and motor activation to allow for proper function of scapular, scapulothoracic and UE control with self care, carrying, lifting, driving/computer work.      Home Exercise Program:    [x] (32292) Reviewed/Progressed HEP activities related to strengthening, flexibility, endurance, ROM of scapular, scapulothoracic and UE control with self care, reaching, carrying, lifting, house/yardwork, driving/computer work  [] (35496) Reviewed/Progressed HEP activities related to improving balance, coordination, kinesthetic sense, posture, motor skill, proprioception of scapular, scapulothoracic and UE control with self care, reaching, carrying, lifting, house/yardwork, driving/computer work      Manual Treatments:  PROM / STM / Oscillations-Mobs:  G-I, II, III, IV (PA's, Inf., Post.)  [x] (16175) Provided manual therapy to mobilize soft tissue/joints of cervical/CT, scapular GHJ and UE for the purpose of modulating pain, promoting relaxation,  increasing ROM, reducing/eliminating soft tissue swelling/inflammation/restriction, improving soft tissue extensibility and allowing for proper ROM for normal function with self care, reaching, carrying, lifting, house/yardwork, driving/computer work    Modalities:      [] GR/ESU 15 min    [] GR 15 min   [] ESU     [] CP    [] MHP    [x] declined    Charges:  Timed Code Treatment Minutes: 40   Total Treatment Minutes: 40     [] EVAL (LOW) 64826 (typically 20 minutes face-to-face)  [] EVAL (MOD) 07406 (typically 30 minutes face-to-face)  [] EVAL (HIGH) 67049 (typically 45 minutes face-to-face)  [] RE-EVAL     [x] SJ(17331) x 2     [] IONTO  [] NMR (61145) x    [] VASO  [x] Manual (49074) x 1    [] Other:  [] TA x      [] Mech Traction (44571)  [] ES(attended) (75009)      [] ES (un) (05867):     GOALS: Patient stated goal: Pain free shoulder  [] Progressing: [] Met: [] Not Met: [] Adjusted    Therapist goals for Patient:   Short Term Goals: To be achieved in: 2 weeks  1. Independent in HEP and progression per patient tolerance, in order to prevent re-injury. [] Progressing: [x] Met: [] Not Met: [] Adjusted  2. Patient will have a decrease in pain to facilitate improvement in movement, function, and ADLs as indicated by Functional Deficits. [] Progressing: [x] Met: [] Not Met: [] Adjusted    Long Term Goals: To be achieved in: 12 weeks  1. Disability index score of 20% or less for the [facility] to assist with reaching prior level of function. [] Progressing: [x] Met: [] Not Met: [] Adjusted  2. Patient will demonstrate increased AROM to 160 flex, T11 IR all painfree to allow for proper joint functioning as indicated by patients Functional Deficits. [x] Progressing: [] Met: [] Not Met: [] Adjusted  3. Patient will demonstrate an increase in Strength to 4+/5 pain free to allow for proper functional mobility as indicated by patients Functional Deficits. [x] Progressing: [] Met: [] Not Met: [] Adjusted  4. Patient will return to normal lifting and daily activites without increased symptoms or restriction. [] Progressing: [x] Met: [] Not Met: [] Adjusted  5.  Patient will demonstrate appropriate scapular mechanics with no UT compensation to reduce risk of re-injury. [] Progressing: [x] Met: [] Not Met: [] Adjusted      Overall Progression Towards Functional goals/ Treatment Progress Update:  [x] Patient is progressing as expected towards functional goals listed. [] Progression is slowed due to complexities/Impairments listed. [] Progression has been slowed due to co-morbidities. [] Plan just implemented, too soon to assess goals progression <30days   [] Goals require adjustment due to lack of progress  [] Patient is not progressing as expected and requires additional follow up with physician  [] Other    Prognosis for POC: [x] Good [] Fair  [] Poor      Patient requires continued skilled intervention: [x] Yes  [] No      ASSESSMENT:  Tightness of rotational movements along with posterior capsule tightness. Improving inferior glide. Improved AROM, still painful and stiff feeling. Treatment/Activity Tolerance:  [x] Patient tolerated treatment well [] Patient limited by fatique  [] Patient limited by pain  [] Patient limited by other medical complications  [] Other:     Patient Requires Follow-up: [x] Yes  [] No    PLAN: Continue 1x week for up to 6 weeks for HEP progression and mobilizations to improve flex and IR ROM  [x] Continue per plan of care [] Alter current plan (see comments above)  [] Plan of care initiated [] Hold pending MD visit [] Discharge      Electronically signed by:  Alexia Cummings, PT , DPT [id number]      Note: If patient does not return for scheduled/ recommended follow up visits, this note will serve as a discharge from care along with most recent update on progress.

## 2020-11-12 ENCOUNTER — HOSPITAL ENCOUNTER (OUTPATIENT)
Dept: PHYSICAL THERAPY | Age: 50
Setting detail: THERAPIES SERIES
Discharge: HOME | End: 2020-11-12
Payer: COMMERCIAL

## 2020-11-12 PROCEDURE — 97140 MANUAL THERAPY 1/> REGIONS: CPT

## 2020-11-12 PROCEDURE — 97110 THERAPEUTIC EXERCISES: CPT

## 2020-11-12 NOTE — PROGRESS NOTES
[facility], [address] [place]  Phone: [phone]  Fax [phone]    Physical Therapy Progress Report/Daily Treatment Note  Date:  2020    Patient Name:  Murtaza Funes    :  1970  MRN: [id number]  Restrictions/Precautions:    Physician Information:  Referring Practitioner: Blanca Larios  Medical/Treatment Diagnosis Information:  · Diagnosis: M25.512 (ICD-10-CM) - Left shoulder pain, unspecified chronicity  · Treatment Diagnosis: Left shoulder pain M25.512, left shoulder stiffness M25.612, left shoulder general weakness M62.82      [x] Conservative / [] Surgical - DOS:  Therapy Diagnosis/Practice Pattern:  Practice Pattern E: Localized Inflammation   Insurance/Certification information:  PT Insurance Information:  BCBS - 500D-80/20-$[id number]-60PT/OT  Plan of care signed: [x] YES  [] NO  Number of Comorbidities:  []0     [x]1-2    []3+  Date of Patient follow up with Physician:     Is this a Progress Report:     []  Yes  [x]  No        If Yes:  Date Range for reporting period:  Beginning 10/5/2020  Ending     Progress report will be due (10 Rx or 30 days whichever is less):        Recertification will be due (POC Duration  / 90 days whichever is less): 2020     Progress Note: [x]  Yes  []  No  Next due by: Visit #10        Latex Allergy:  [x]NO      []YES  Preferred Language for Healthcare:   [x]English       []other:    Visit # Insurance Allowable Reporting Period   9 60 PT Begin Date: 2020               End Date:      SUBJECTIVE:  Pt reports shoulder is doing really well. Rarely has pain, occasionally with certain motions but not every time. Still has trouble reaching behind back.     OBJECTIVE:    Observation:  Limited ER passively, tight posterior capsule    Palpation:     Test used Initial score Current Score  10/20/2020   Pain Summary VAS 0-8 0-5   Functional questionnaire QDash 48% sets - 1-2x daily - 7x weekly    Therapeutic Exercise and NMR EXR  [x] (28764) Provided verbal/tactile cueing for activities related to strengthening, flexibility, endurance, ROM  for improvements in scapular, scapulothoracic and UE control with self care, reaching, carrying, lifting, house/yardwork, driving/computer work.    [] (76061) Provided verbal/tactile cueing for activities related to improving balance, coordination, kinesthetic sense, posture, motor skill, proprioception  to assist with  scapular, scapulothoracic and UE control with self care, reaching, carrying, lifting, house/yardwork, driving/computer work. Therapeutic Activities:    [] (65055 or 22357) Provided verbal/tactile cueing for activities related to improving balance, coordination, kinesthetic sense, posture, motor skill, proprioception and motor activation to allow for proper function of scapular, scapulothoracic and UE control with self care, carrying, lifting, driving/computer work.      Home Exercise Program:    [x] (59638) Reviewed/Progressed HEP activities related to strengthening, flexibility, endurance, ROM of scapular, scapulothoracic and UE control with self care, reaching, carrying, lifting, house/yardwork, driving/computer work  [] (09309) Reviewed/Progressed HEP activities related to improving balance, coordination, kinesthetic sense, posture, motor skill, proprioception of scapular, scapulothoracic and UE control with self care, reaching, carrying, lifting, house/yardwork, driving/computer work      Manual Treatments:  PROM / STM / Oscillations-Mobs:  G-I, II, III, IV (PA's, Inf., Post.)  [x] (86636) Provided manual therapy to mobilize soft tissue/joints of cervical/CT, scapular GHJ and UE for the purpose of modulating pain, promoting relaxation,  increasing ROM, reducing/eliminating soft tissue swelling/inflammation/restriction, improving soft tissue extensibility and allowing for proper ROM for normal function with self care, reaching, carrying, lifting, house/yardwork, driving/computer work    Modalities:      [] GR/ESU 15 min    [] GR 15 min   [] ESU     [] CP    [] MHP    [x] declined    Charges:  Timed Code Treatment Minutes: 40   Total Treatment Minutes: 40     [] EVAL (LOW) 78302 (typically 20 minutes face-to-face)  [] EVAL (MOD) 40247 (typically 30 minutes face-to-face)  [] EVAL (HIGH) 68119 (typically 45 minutes face-to-face)  [] RE-EVAL     [x] MD(78511) x 2     [] IONTO  [] NMR (39782) x    [] VASO  [x] Manual (66819) x 1    [] Other:  [] TA x      [] Mech Traction (00856)  [] ES(attended) (93323)      [] ES (un) (95487):     GOALS: Patient stated goal: Pain free shoulder  [] Progressing: [] Met: [] Not Met: [] Adjusted    Therapist goals for Patient:   Short Term Goals: To be achieved in: 2 weeks  1. Independent in HEP and progression per patient tolerance, in order to prevent re-injury. [] Progressing: [x] Met: [] Not Met: [] Adjusted  2. Patient will have a decrease in pain to facilitate improvement in movement, function, and ADLs as indicated by Functional Deficits. [] Progressing: [x] Met: [] Not Met: [] Adjusted    Long Term Goals: To be achieved in: 12 weeks  1. Disability index score of 20% or less for the [facility] to assist with reaching prior level of function. [] Progressing: [x] Met: [] Not Met: [] Adjusted  2. Patient will demonstrate increased AROM to 160 flex, T11 IR all painfree to allow for proper joint functioning as indicated by patients Functional Deficits. [x] Progressing: [] Met: [] Not Met: [] Adjusted  3. Patient will demonstrate an increase in Strength to 4+/5 pain free to allow for proper functional mobility as indicated by patients Functional Deficits. [x] Progressing: [] Met: [] Not Met: [] Adjusted  4. Patient will return to normal lifting and daily activites without increased symptoms or restriction. [] Progressing: [x] Met: [] Not Met: [] Adjusted  5.  Patient will demonstrate appropriate scapular mechanics with no UT compensation to reduce risk of re-injury. [] Progressing: [x] Met: [] Not Met: [] Adjusted      Overall Progression Towards Functional goals/ Treatment Progress Update:  [x] Patient is progressing as expected towards functional goals listed. [] Progression is slowed due to complexities/Impairments listed. [] Progression has been slowed due to co-morbidities. [] Plan just implemented, too soon to assess goals progression <30days   [] Goals require adjustment due to lack of progress  [] Patient is not progressing as expected and requires additional follow up with physician  [] Other    Prognosis for POC: [x] Good [] Fair  [] Poor      Patient requires continued skilled intervention: [x] Yes  [] No      ASSESSMENT:  Tightness of rotational movements along with posterior capsule tightness. Improving inferior glide. Improved AROM, still painful and stiff feeling. Treatment/Activity Tolerance:  [x] Patient tolerated treatment well [] Patient limited by fatique  [] Patient limited by pain  [] Patient limited by other medical complications  [] Other:     Patient Requires Follow-up: [x] Yes  [] No    PLAN: Continue 1x week for up to 6 weeks for HEP progression and mobilizations to improve flex and IR ROM  [x] Continue per plan of care [] Alter current plan (see comments above)  [] Plan of care initiated [] Hold pending MD visit [] Discharge      Electronically signed by:  Martir Trinidad PT , DPT [id number]      Note: If patient does not return for scheduled/ recommended follow up visits, this note will serve as a discharge from care along with most recent update on progress.

## 2020-11-17 ENCOUNTER — HOSPITAL ENCOUNTER (OUTPATIENT)
Dept: PHYSICAL THERAPY | Age: 50
Setting detail: THERAPIES SERIES
Discharge: HOME | End: 2020-11-17
Payer: COMMERCIAL

## 2020-11-17 PROCEDURE — 97110 THERAPEUTIC EXERCISES: CPT

## 2020-11-17 PROCEDURE — 97140 MANUAL THERAPY 1/> REGIONS: CPT

## 2020-11-17 NOTE — FLOWSHEET NOTE
naga  [facility],  [address]  Phone: [phone]  Fax [phone]    Physical Therapy Daily Treatment Note  Date:  2020    Patient Name:  Chasidy Dykes    :  1970  MRN: [id number]  Restrictions/Precautions:    Physician Information:  Referring Practitioner: Tho Larios  Medical/Treatment Diagnosis Information:  · Diagnosis: M25.512 (ICD-10-CM) - Left shoulder pain, unspecified chronicity  · Treatment Diagnosis: Left shoulder pain M25.512, left shoulder stiffness M25.612, left shoulder general weakness M62.82      [x] Conservative / [] Surgical - DOS:  Therapy Diagnosis/Practice Pattern:  Practice Pattern E: Localized Inflammation   Insurance/Certification information:  PT Insurance Information:  BCBS - 500D-80/20-$[id number]-60PT/OT  Plan of care signed: [x] YES  [] NO  Number of Comorbidities:  []0     [x]1-2    []3+  Date of Patient follow up with Physician:     Is this a Progress Report:     []  Yes  [x]  No        If Yes:  Date Range for reporting period:  Beginning 10/5/2020  Ending     Progress report will be due (10 Rx or 30 days whichever is less):        Recertification will be due (POC Duration  / 90 days whichever is less): 2020     Progress Note: [x]  Yes  []  No  Next due by: Visit #10        Latex Allergy:  [x]NO      []YES  Preferred Language for Healthcare:   [x]English       []other:    Visit # Insurance Allowable Reporting Period   10 60 PT Begin Date: 2020               End Date:      SUBJECTIVE:  Pt reports shoulder doing a little better each day, biggest complaint is with reaching behind back. OBJECTIVE:    Observation:  Limited ER/IR passively, tight posterior capsule    Palpation:     Test used Initial score Current Score  2020   Pain Summary VAS 0-8 0-5   Functional questionnaire QDash 48%  32/55 20%  20/55   ROM flexion 155 P! 170    ER T1 P!  T2 and NMR EXR  [x] (09112) Provided verbal/tactile cueing for activities related to strengthening, flexibility, endurance, ROM  for improvements in scapular, scapulothoracic and UE control with self care, reaching, carrying, lifting, house/yardwork, driving/computer work.    [] (82156) Provided verbal/tactile cueing for activities related to improving balance, coordination, kinesthetic sense, posture, motor skill, proprioception  to assist with  scapular, scapulothoracic and UE control with self care, reaching, carrying, lifting, house/yardwork, driving/computer work. Therapeutic Activities:    [] (59043 or 40367) Provided verbal/tactile cueing for activities related to improving balance, coordination, kinesthetic sense, posture, motor skill, proprioception and motor activation to allow for proper function of scapular, scapulothoracic and UE control with self care, carrying, lifting, driving/computer work.      Home Exercise Program:    [x] (22972) Reviewed/Progressed HEP activities related to strengthening, flexibility, endurance, ROM of scapular, scapulothoracic and UE control with self care, reaching, carrying, lifting, house/yardwork, driving/computer work  [] (59489) Reviewed/Progressed HEP activities related to improving balance, coordination, kinesthetic sense, posture, motor skill, proprioception of scapular, scapulothoracic and UE control with self care, reaching, carrying, lifting, house/yardwork, driving/computer work      Manual Treatments:  PROM / STM / Oscillations-Mobs:  G-I, II, III, IV (PA's, Inf., Post.)  [x] (51534) Provided manual therapy to mobilize soft tissue/joints of cervical/CT, scapular GHJ and UE for the purpose of modulating pain, promoting relaxation,  increasing ROM, reducing/eliminating soft tissue swelling/inflammation/restriction, improving soft tissue extensibility and allowing for proper ROM for normal function with self care, reaching, carrying, lifting, house/yardwork, driving/computer work    Modalities:      [] GR/ESU 15 min    [] GR 15 min   [] ESU     [] CP    [] MHP    [x] declined    Charges:  Timed Code Treatment Minutes: 40   Total Treatment Minutes: 40     [] EVAL (LOW) 06437 (typically 20 minutes face-to-face)  [] EVAL (MOD) 23175 (typically 30 minutes face-to-face)  [] EVAL (HIGH) 04696 (typically 45 minutes face-to-face)  [] RE-EVAL     [x] GK(22724) x 2     [] IONTO  [] NMR (97657) x    [] VASO  [x] Manual (50479) x 1    [] Other:  [] TA x      [] Mech Traction (28863)  [] ES(attended) (94305)      [] ES (un) (69435):     GOALS: Patient stated goal: Pain free shoulder  [] Progressing: [] Met: [] Not Met: [] Adjusted    Therapist goals for Patient:   Short Term Goals: To be achieved in: 2 weeks  1. Independent in HEP and progression per patient tolerance, in order to prevent re-injury. [] Progressing: [x] Met: [] Not Met: [] Adjusted  2. Patient will have a decrease in pain to facilitate improvement in movement, function, and ADLs as indicated by Functional Deficits. [] Progressing: [x] Met: [] Not Met: [] Adjusted    Long Term Goals: To be achieved in: 12 weeks  1. Disability index score of 20% or less for the [facility] to assist with reaching prior level of function. [] Progressing: [x] Met: [] Not Met: [] Adjusted  2. Patient will demonstrate increased AROM to 160 flex, T11 IR all painfree to allow for proper joint functioning as indicated by patients Functional Deficits. [x] Progressing: [] Met: [] Not Met: [] Adjusted  3. Patient will demonstrate an increase in Strength to 4+/5 pain free to allow for proper functional mobility as indicated by patients Functional Deficits. [x] Progressing: [] Met: [] Not Met: [] Adjusted  4. Patient will return to normal lifting and daily activites without increased symptoms or restriction. [] Progressing: [x] Met: [] Not Met: [] Adjusted  5.  Patient will demonstrate appropriate scapular mechanics with no UT compensation to reduce risk of re-injury. [] Progressing: [x] Met: [] Not Met: [] Adjusted      Overall Progression Towards Functional goals/ Treatment Progress Update:  [x] Patient is progressing as expected towards functional goals listed. [] Progression is slowed due to complexities/Impairments listed. [] Progression has been slowed due to co-morbidities. [] Plan just implemented, too soon to assess goals progression <30days   [] Goals require adjustment due to lack of progress  [] Patient is not progressing as expected and requires additional follow up with physician  [] Other    Prognosis for POC: [x] Good [] Fair  [] Poor      Patient requires continued skilled intervention: [x] Yes  [] No      ASSESSMENT:  Tightness of rotational movements along with posterior capsule tightness. Improving inferior glide. Improved AROM, still painful and stiff feeling. Treatment/Activity Tolerance:  [x] Patient tolerated treatment well [] Patient limited by fatique  [] Patient limited by pain  [] Patient limited by other medical complications  [] Other:     Patient Requires Follow-up: [x] Yes  [] No    PLAN: Continue 1x week for up to 6 weeks for HEP progression and mobilizations to improve flex and IR ROM  [x] Continue per plan of care [] Alter current plan (see comments above)  [] Plan of care initiated [] Hold pending MD visit [] Discharge      Electronically signed by:  Lyric Mena PT , DPT [id number]      Note: If patient does not return for scheduled/ recommended follow up visits, this note will serve as a discharge from care along with most recent update on progress.

## 2020-11-24 ENCOUNTER — HOSPITAL ENCOUNTER (OUTPATIENT)
Dept: PHYSICAL THERAPY | Age: 50
Setting detail: THERAPIES SERIES
Discharge: HOME | End: 2020-11-24
Payer: COMMERCIAL

## 2020-11-24 PROCEDURE — 97110 THERAPEUTIC EXERCISES: CPT

## 2020-11-24 PROCEDURE — 97140 MANUAL THERAPY 1/> REGIONS: CPT

## 2020-11-24 NOTE — FLOWSHEET NOTE
naga  [facility],  [address]  Phone: [phone]  Fax [phone]    Physical Therapy Daily Treatment Note  Date:  2020    Patient Name:  Eric Hendrix    :  1970  MRN: [id number]  Restrictions/Precautions:    Physician Information:  Referring Practitioner: Emir Larios  Medical/Treatment Diagnosis Information:  · Diagnosis: M25.512 (ICD-10-CM) - Left shoulder pain, unspecified chronicity  · Treatment Diagnosis: Left shoulder pain M25.512, left shoulder stiffness M25.612, left shoulder general weakness M62.82      [x] Conservative / [] Surgical - DOS:  Therapy Diagnosis/Practice Pattern:  Practice Pattern E: Localized Inflammation   Insurance/Certification information:  PT Insurance Information:  BCBS - 500D-80/20-$[id number]-60PT/OT  Plan of care signed: [x] YES  [] NO  Number of Comorbidities:  []0     [x]1-2    []3+  Date of Patient follow up with Physician:     Is this a Progress Report:     []  Yes  [x]  No        If Yes:  Date Range for reporting period:  Beginning 10/5/2020  Ending     Progress report will be due (10 Rx or 30 days whichever is less):        Recertification will be due (POC Duration  / 90 days whichever is less): 2020     Progress Note: [x]  Yes  []  No  Next due by: Visit #10        Latex Allergy:  [x]NO      []YES  Preferred Language for Healthcare:   [x]English       []other:    Visit # Insurance Allowable Reporting Period   11 60 PT Begin Date: 2020               End Date:      SUBJECTIVE:  Pt reports shoulder has not felt this good in a year. Still some trouble getting jacket on/off if he does not do it the right order.     OBJECTIVE:    Observation:  Limited ER/IR passively, tight posterior capsule    Palpation:     Test used Initial score Current Score  2020   Pain Summary VAS 0-8 0-5   Functional questionnaire QDash 48%  32/55 20%  20/55   ROM flexion 155 P! 170    ER T1 P! T2    ABD      IR L3 P! T12 P! Strength flexion 4+ P! 4+    ER 4 P! 4    ABD 4- P! 4    IR 4- P! 4      RESTRICTIONS/PRECAUTIONS: none    Exercises/Interventions: bilateral unless noted otherwise  Therapeutic Ex Sets/reps Notes HEP   Supine pec stretch  X   Supine snow nkechi  Supine alt UE ext   Unable to achieve ER to perform snow nkechi - held today  Held snow angle   Quadruped thread the needle  Quadruped scap lift   X   TB wall slide with low trap set lift  TB 3 way on wall  DB scaption 2 x 12  2 x 12 5# OVL  RTB fatigue X   CC single arm row. chest press  CC double arm row  CC straight arm lat pulldown  TB ER walkout  TB IR walkout 2 x 12  2 x 12  2 x 12  12 x  12 x 35#  60#  50#  10#  10#    Prone over SB row/ext  7#    Supine posterior capsule stretch  Sleeper IR stretch  Side lying behind back IR stretch 5 x 30\"  5 x 30\"  5#   5 x 30\"      Cane ER stretch   x   Supine SA punch t  90/120    SL ER 5# 2 x 15   x   IR strap stretch 3 x 30\"                      Pt ed: anatomy, PT progression, RICE      Manual Intervention      PROM, grade 3-4 joint mobs 15 min Focused on inferior glide of GHJ progressed from G3-4                                        NMR re-education                                                        Access Code: [email or web address]   URL: [email or web address]   Date: 10/05/2020   Prepared by: Thompson Estevez     Exercises  Supine Chest Stretch on Foam Roll - 2-3 sets - 10 Breaths - 1-2x daily - 7x weekly  Supine Snow nkechi - 10 reps - 2-3 sets - 1-2x daily - 7x weekly  Dead Bug Alternating Arm Extension - 10 reps - 2-3 sets - 1-2x daily - 7x weekly  Quadruped Thoracic Rotation - Reach Under - 10 reps - 2-3 sets - 1-2x daily - 7x weekly  Quadruped Shoulder Flexion with Supination and Lift - 10 reps - 2-3 sets - 1-2x daily - 7x weekly  Standing Low Trap Setting with Resistance at [place] - 10 reps - 2-3 sets - 1-2x daily - 7x weekly    Therapeutic Exercise and NMR EXR  [x] (70211) Provided verbal/tactile cueing for activities related to strengthening, flexibility, endurance, ROM  for improvements in scapular, scapulothoracic and UE control with self care, reaching, carrying, lifting, house/yardwork, driving/computer work.    [] (31257) Provided verbal/tactile cueing for activities related to improving balance, coordination, kinesthetic sense, posture, motor skill, proprioception  to assist with  scapular, scapulothoracic and UE control with self care, reaching, carrying, lifting, house/yardwork, driving/computer work. Therapeutic Activities:    [] (74858 or 12118) Provided verbal/tactile cueing for activities related to improving balance, coordination, kinesthetic sense, posture, motor skill, proprioception and motor activation to allow for proper function of scapular, scapulothoracic and UE control with self care, carrying, lifting, driving/computer work.      Home Exercise Program:    [x] (22124) Reviewed/Progressed HEP activities related to strengthening, flexibility, endurance, ROM of scapular, scapulothoracic and UE control with self care, reaching, carrying, lifting, house/yardwork, driving/computer work  [] (80816) Reviewed/Progressed HEP activities related to improving balance, coordination, kinesthetic sense, posture, motor skill, proprioception of scapular, scapulothoracic and UE control with self care, reaching, carrying, lifting, house/yardwork, driving/computer work      Manual Treatments:  PROM / STM / Oscillations-Mobs:  G-I, II, III, IV (PA's, Inf., Post.)  [x] (59036) Provided manual therapy to mobilize soft tissue/joints of cervical/CT, scapular GHJ and UE for the purpose of modulating pain, promoting relaxation,  increasing ROM, reducing/eliminating soft tissue swelling/inflammation/restriction, improving soft tissue extensibility and allowing for proper ROM for normal function with self care, reaching, carrying, lifting, house/yardwork, driving/computer work    Modalities:      [] GR/ESU 15 min    [] GR 15 min   [] ESU     [] CP    [] MHP    [x] declined    Charges:  Timed Code Treatment Minutes: 40   Total Treatment Minutes: 40     [] EVAL (LOW) 92322 (typically 20 minutes face-to-face)  [] EVAL (MOD) 59512 (typically 30 minutes face-to-face)  [] EVAL (HIGH) 58262 (typically 45 minutes face-to-face)  [] RE-EVAL     [x] PH(02478) x 2     [] IONTO  [] NMR (23852) x    [] VASO  [x] Manual (02856) x 1    [] Other:  [] TA x      [] Mech Traction (79289)  [] ES(attended) (22230)      [] ES (un) (28633):     GOALS: Patient stated goal: Pain free shoulder  [] Progressing: [] Met: [] Not Met: [] Adjusted    Therapist goals for Patient:   Short Term Goals: To be achieved in: 2 weeks  1. Independent in HEP and progression per patient tolerance, in order to prevent re-injury. [] Progressing: [x] Met: [] Not Met: [] Adjusted  2. Patient will have a decrease in pain to facilitate improvement in movement, function, and ADLs as indicated by Functional Deficits. [] Progressing: [x] Met: [] Not Met: [] Adjusted    Long Term Goals: To be achieved in: 12 weeks  1. Disability index score of 20% or less for the [facility] to assist with reaching prior level of function. [] Progressing: [x] Met: [] Not Met: [] Adjusted  2. Patient will demonstrate increased AROM to 160 flex, T11 IR all painfree to allow for proper joint functioning as indicated by patients Functional Deficits. [x] Progressing: [] Met: [] Not Met: [] Adjusted  3. Patient will demonstrate an increase in Strength to 4+/5 pain free to allow for proper functional mobility as indicated by patients Functional Deficits. [x] Progressing: [] Met: [] Not Met: [] Adjusted  4. Patient will return to normal lifting and daily activites without increased symptoms or restriction. [] Progressing: [x] Met: [] Not Met: [] Adjusted  5.  Patient will demonstrate appropriate scapular mechanics with no UT compensation to reduce risk of re-injury. [] Progressing: [x] Met: [] Not Met: [] Adjusted      Overall Progression Towards Functional goals/ Treatment Progress Update:  [x] Patient is progressing as expected towards functional goals listed. [] Progression is slowed due to complexities/Impairments listed. [] Progression has been slowed due to co-morbidities. [] Plan just implemented, too soon to assess goals progression <30days   [] Goals require adjustment due to lack of progress  [] Patient is not progressing as expected and requires additional follow up with physician  [] Other    Prognosis for POC: [x] Good [] Fair  [] Poor      Patient requires continued skilled intervention: [x] Yes  [] No      ASSESSMENT:  Tightness of rotational movements along with posterior capsule tightness. Improving inferior glide. Improved AROM, still painful and stiff feeling. Treatment/Activity Tolerance:  [x] Patient tolerated treatment well [] Patient limited by fatique  [] Patient limited by pain  [] Patient limited by other medical complications  [] Other:     Patient Requires Follow-up: [x] Yes  [] No    PLAN: Continue 1x week for up to 6 weeks for HEP progression and mobilizations to improve flex and IR ROM  [x] Continue per plan of care [] Alter current plan (see comments above)  [] Plan of care initiated [] Hold pending MD visit [] Discharge      Electronically signed by:  Fernando Olivo, PT , DPT [id number]      Note: If patient does not return for scheduled/ recommended follow up visits, this note will serve as a discharge from care along with most recent update on progress.

## 2020-12-03 ENCOUNTER — HOSPITAL ENCOUNTER (OUTPATIENT)
Dept: PHYSICAL THERAPY | Age: 50
Setting detail: THERAPIES SERIES
Discharge: HOME | End: 2020-12-03
Payer: COMMERCIAL

## 2020-12-03 PROCEDURE — 97110 THERAPEUTIC EXERCISES: CPT

## 2020-12-03 PROCEDURE — 97140 MANUAL THERAPY 1/> REGIONS: CPT

## 2020-12-03 NOTE — PROGRESS NOTES
[facility],  [address]  Phone: [phone]  Fax [phone]    Physical Therapy Progress Note/ Daily Treatment Note  Date:  12/3/2020    Patient Name:  Daniel Albarado    :  1970  MRN: [id number]  Restrictions/Precautions:    Physician Information:  Referring Practitioner: Iraida Larios  Medical/Treatment Diagnosis Information:  · Diagnosis: M25.512 (ICD-10-CM) - Left shoulder pain, unspecified chronicity  · Treatment Diagnosis: Left shoulder pain M25.512, left shoulder stiffness M25.612, left shoulder general weakness M62.82      [x] Conservative / [] Surgical - DOS:  Therapy Diagnosis/Practice Pattern:  Practice Pattern E: Localized Inflammation   Insurance/Certification information:  PT Insurance Information:  BCBS - 500D-80/20-$0CP-60PT/OT  Plan of care signed: [x] YES  [] NO  Number of Comorbidities:  []0     [x]1-2    []3+  Date of Patient follow up with Physician:     Is this a Progress Report:     [x]  Yes  []  No        If Yes:  Date Range for reporting period:  Beginning 2020  Ending 12/3/2020    Progress report will be due (10 Rx or 30 days whichever is less): 5923      Recertification will be due (POC Duration  / 90 days whichever is less): 2020     Progress Note: [x]  Yes  []  No  Next due by: Visit #10        Latex Allergy:  [x]NO      []YES  Preferred Language for Healthcare:   [x]English       []other:    Visit # Insurance Allowable Reporting Period   12 60 PT Begin Date: 12/3/2020               End Date:      SUBJECTIVE:  Pt reports shoulder continues to improve, still biggest complaint is lifting arm behind back. To tuck shirt in put jacket on.     OBJECTIVE:    Observation:  Limited ER/IR passively, tight posterior capsule    Palpation:     Test used Initial score Current Score  2020   Pain Summary VAS 0-8 0-5   Functional questionnaire QDash 48%   18%   ROM flexion 155 P! Eqaual    ER T1 P! T2    ABD      IR L3 P! T12 discomfort   Strength flexion 4+ P! 5    ER 4 P! 4+    ABD 4- P! 5    IR 4- P! 4+      RESTRICTIONS/PRECAUTIONS: none    Exercises/Interventions: bilateral unless noted otherwise  Therapeutic Ex Sets/reps Notes HEP   Supine pec stretch  X   Supine snow nkechi  Supine alt UE ext   Unable to achieve ER to perform snow nkechi - held today  Held snow angle   Quadruped thread the needle  Quadruped scap lift   X   TB wall slide with low trap set lift  TB 3 way on wall  DB scaption 2 x 12  2 x 12 6# OVL  RTB fatigue X   CC seated wide arm pull down  CC single arm row/chest press  CC double arm row  CC straight arm lat pulldown  CC ER walkout  CC IR walkout 2 x 10  2 x 10  2 x 10  2 x 10  12 x  12 x 70#  40#  60#  50#  10#  10#    Prone over SB row/ext  7#    Supine posterior capsule stretch  Sleeper IR stretch  Side lying behind back IR stretch 5 x 30\"  5 x 30\"  5#   5 x 30\"      Cane ER stretch   x   Supine SA punch t  90/120    SL ER 5# 2 x 15   x   IR strap stretch 3 x 30\"                      Pt ed: anatomy, PT progression, RICE      Manual Intervention      PROM, grade 3-4 joint mobs 15 min Focused on inferior glide of GHJ progressed from G3-4                                        NMR re-education                                                        Access Code: [email or web address]   URL: [email or web address]   Date: 10/05/2020   Prepared by: Tony Abhilash     Exercises  Supine Chest Stretch on Foam Roll - 2-3 sets - 10 Breaths - 1-2x daily - 7x weekly  Supine Snow nkechi - 10 reps - 2-3 sets - 1-2x daily - 7x weekly  Dead Bug Alternating Arm Extension - 10 reps - 2-3 sets - 1-2x daily - 7x weekly  Quadruped Thoracic Rotation - Reach Under - 10 reps - 2-3 sets - 1-2x daily - 7x weekly  Quadruped Shoulder Flexion with Supination and Lift - 10 reps - 2-3 sets - 1-2x daily - 7x weekly  Standing Low Trap Setting with Resistance at [address] 10 reps - 2-3 sets - 1-2x daily - 7x weekly    Therapeutic Exercise and NMR EXR  [x] (86293) Provided verbal/tactile cueing for activities related to strengthening, flexibility, endurance, ROM  for improvements in scapular, scapulothoracic and UE control with self care, reaching, carrying, lifting, house/yardwork, driving/computer work.    [] (17244) Provided verbal/tactile cueing for activities related to improving balance, coordination, kinesthetic sense, posture, motor skill, proprioception  to assist with  scapular, scapulothoracic and UE control with self care, reaching, carrying, lifting, house/yardwork, driving/computer work. Therapeutic Activities:    [] (21147 or 61839) Provided verbal/tactile cueing for activities related to improving balance, coordination, kinesthetic sense, posture, motor skill, proprioception and motor activation to allow for proper function of scapular, scapulothoracic and UE control with self care, carrying, lifting, driving/computer work.      Home Exercise Program:    [x] (29899) Reviewed/Progressed HEP activities related to strengthening, flexibility, endurance, ROM of scapular, scapulothoracic and UE control with self care, reaching, carrying, lifting, house/yardwork, driving/computer work  [] (07222) Reviewed/Progressed HEP activities related to improving balance, coordination, kinesthetic sense, posture, motor skill, proprioception of scapular, scapulothoracic and UE control with self care, reaching, carrying, lifting, house/yardwork, driving/computer work      Manual Treatments:  PROM / STM / Oscillations-Mobs:  G-I, II, III, IV (PA's, Inf., Post.)  [x] (72893) Provided manual therapy to mobilize soft tissue/joints of cervical/CT, scapular GHJ and UE for the purpose of modulating pain, promoting relaxation,  increasing ROM, reducing/eliminating soft tissue swelling/inflammation/restriction, improving soft tissue extensibility and allowing for proper ROM for normal function with self care, reaching, carrying, lifting, house/yardwork, driving/computer work    Modalities:      [] GR/ESU 15 min    [] GR 15 min   [] ESU     [] CP    [] MHP    [x] declined    Charges:  Timed Code Treatment Minutes: 40   Total Treatment Minutes: 40     [] EVAL (LOW) 36092 (typically 20 minutes face-to-face)  [] EVAL (MOD) 05573 (typically 30 minutes face-to-face)  [] EVAL (HIGH) 11113 (typically 45 minutes face-to-face)  [] RE-EVAL     [x] CC(65681) x 2     [] IONTO  [] NMR (43653) x    [] VASO  [x] Manual (25733) x 1    [] Other:  [] TA x      [] Mech Traction (72716)  [] ES(attended) (88447)      [] ES (un) (41937):     GOALS: Patient stated goal: Pain free shoulder  [] Progressing: [] Met: [] Not Met: [] Adjusted    Therapist goals for Patient:   Short Term Goals: To be achieved in: 2 weeks  1. Independent in HEP and progression per patient tolerance, in order to prevent re-injury. [] Progressing: [x] Met: [] Not Met: [] Adjusted  2. Patient will have a decrease in pain to facilitate improvement in movement, function, and ADLs as indicated by Functional Deficits. [] Progressing: [x] Met: [] Not Met: [] Adjusted    Long Term Goals: To be achieved in: 12 weeks  1. Disability index score of 20% or less for the [facility] to assist with reaching prior level of function. [] Progressing: [x] Met: [] Not Met: [] Adjusted  2. Patient will demonstrate increased AROM to 160 flex, T11 IR all painfree to allow for proper joint functioning as indicated by patients Functional Deficits. [x] Progressing: [] Met: [] Not Met: [] Adjusted  3. Patient will demonstrate an increase in Strength to 4+/5 pain free to allow for proper functional mobility as indicated by patients Functional Deficits. [] Progressing: [x] Met: [] Not Met: [] Adjusted  4. Patient will return to normal lifting and daily activites without increased symptoms or restriction. [] Progressing: [x] Met: [] Not Met: [] Adjusted  5.  Patient will demonstrate appropriate scapular mechanics with no UT compensation to reduce risk of re-injury. [] Progressing: [x] Met: [] Not Met: [] Adjusted      Overall Progression Towards Functional goals/ Treatment Progress Update:  [x] Patient is progressing as expected towards functional goals listed. [] Progression is slowed due to complexities/Impairments listed. [] Progression has been slowed due to co-morbidities. [] Plan just implemented, too soon to assess goals progression <30days   [] Goals require adjustment due to lack of progress  [] Patient is not progressing as expected and requires additional follow up with physician  [] Other    Prognosis for POC: [x] Good [] Fair  [] Poor      Patient requires continued skilled intervention: [x] Yes  [] No      ASSESSMENT:  Tightness of rotational movements along with posterior capsule tightness. Improving inferior glide. Improved AROM, still painful and stiff feeling. Treatment/Activity Tolerance:  [x] Patient tolerated treatment well [] Patient limited by fatique  [] Patient limited by pain  [] Patient limited by other medical complications  [] Other:     Patient Requires Follow-up: [x] Yes  [] No    PLAN: Continue 1x week for up to 6 weeks for HEP progression and mobilizations to improve flex and IR ROM  [x] Continue per plan of care [] Alter current plan (see comments above)  [] Plan of care initiated [] Hold pending MD visit [] Discharge      Electronically signed by:  Daniela Pathak PT , DPT [id number]      Note: If patient does not return for scheduled/ recommended follow up visits, this note will serve as a discharge from care along with most recent update on progress.

## 2020-12-17 ENCOUNTER — HOSPITAL ENCOUNTER (OUTPATIENT)
Dept: PHYSICAL THERAPY | Age: 50
Setting detail: THERAPIES SERIES
Discharge: HOME | End: 2020-12-17
Payer: COMMERCIAL

## 2020-12-17 PROCEDURE — 97140 MANUAL THERAPY 1/> REGIONS: CPT

## 2020-12-17 PROCEDURE — 97110 THERAPEUTIC EXERCISES: CPT

## 2020-12-17 NOTE — PLAN OF CARE
shoulder general weakness M62.82      [x] Conservative / [] Surgical - DOS:  Therapy Diagnosis/Practice Pattern:  Practice Pattern E: Localized Inflammation   Insurance/Certification information:  PT Insurance Information: 2020 BCBS - 500D-80/20-$[id number]-60PT/OT  Plan of care signed: [x] YES  [] NO  Number of Comorbidities:  []0     [x]1-2    []3+  Date of Patient follow up with Physician:     Is this a Progress Report:     [x]  Yes  []  No        If Yes:  Date Range for reporting period:  Beginning 12/3/2020  Ending     Progress report will be due (10 Rx or 30 days whichever is less): 9/61/7980      Recertification will be due (POC Duration  / 90 days whichever is less): 2/11/2021     Progress Note: [x]  Yes  []  No  Next due by: Visit #10        Latex Allergy:  [x]NO      []YES  Preferred Language for Healthcare:   [x]English       []other:    Visit # Insurance Allowable   13 60 PT     SUBJECTIVE:  Pt reports he is doing much better,     OBJECTIVE:    Observation:  Limited ER/IR passively, tight posterior capsule    Palpation:     Test used Initial score Current Score  12/17/2020   Pain Summary VAS 0-8 0-3   Functional questionnaire QDash 48%  32/55 18%  19/55   ROM flexion 155 P! Eqaual    ER T1 P! T2    ABD      IR L3 P! T12 discomfort   Strength flexion 4+ P! 5    ER 4 P! 4+    ABD 4- P! 5    IR 4- P!  4+      RESTRICTIONS/PRECAUTIONS: none    Exercises/Interventions: bilateral unless noted otherwise  Therapeutic Ex Sets/reps Notes HEP   Supine pec stretch  X   Supine snow nkechi  Supine alt UE ext   Unable to achieve ER to perform snow nkechi - held today  Held snow angle   Quadruped thread the needle  Quadruped scap lift   X   TB wall slide with low trap set lift  TB 3 way on wall  DB scaption 2 x 12  2 x 10 7# OVL  RTB fatigue X   CC seated wide arm pull down  CC single arm row/chest press  CC double arm row  CC straight arm lat pulldown  CC ER walkout  CC IR walkout 2 x 10  2 x 10  2 x 10    12 x  12 x 80#  50#  80#  50#  BTB  BTB    Prone over SB row/ext  7#    Supine posterior capsule stretch  Sleeper IR stretch  Side lying behind back IR stretch 3 x 30\"  3 x 30\"  5#   3 x 30\"      Cane ER stretch   x   Supine SA punch t  90/120    SL ER 5# 2 x 15   x   IR strap stretch 3 x 30\"    Functional box lift 2 shelves 15 x  29.5#                 Pt ed: anatomy, PT progression, RICE      Manual Intervention      PROM, grade 3-4 joint mobs 15 min Focused on inferior glide of GHJ progressed from G3-4                                        NMR re-education                                                        Access Code: [email or web address]   URL: [email or web address]   Date: 10/05/2020   Prepared by: Alexia Cummings     Exercises  Supine Chest Stretch on Foam Roll - 2-3 sets - 10 Breaths - 1-2x daily - 7x weekly  Supine Snow nkechi - 10 reps - 2-3 sets - 1-2x daily - 7x weekly  Dead Bug Alternating Arm Extension - 10 reps - 2-3 sets - 1-2x daily - 7x weekly  Quadruped Thoracic Rotation - Reach Under - 10 reps - 2-3 sets - 1-2x daily - 7x weekly  Quadruped Shoulder Flexion with Supination and Lift - 10 reps - 2-3 sets - 1-2x daily - 7x weekly  Standing Low Trap Setting with Resistance at [address] 10 reps - 2-3 sets - 1-2x daily - 7x weekly    Therapeutic Exercise and NMR EXR  [x] (32528) Provided verbal/tactile cueing for activities related to strengthening, flexibility, endurance, ROM  for improvements in scapular, scapulothoracic and UE control with self care, reaching, carrying, lifting, house/yardwork, driving/computer work.    [] (88904) Provided verbal/tactile cueing for activities related to improving balance, coordination, kinesthetic sense, posture, motor skill, proprioception  to assist with  scapular, scapulothoracic and UE control with self care, reaching, carrying, lifting, house/yardwork, driving/computer work.     Therapeutic Activities:    [] (63794 or ) Provided verbal/tactile cueing for activities related to improving balance, coordination, kinesthetic sense, posture, motor skill, proprioception and motor activation to allow for proper function of scapular, scapulothoracic and UE control with self care, carrying, lifting, driving/computer work.      Home Exercise Program:    [x] (77251) Reviewed/Progressed HEP activities related to strengthening, flexibility, endurance, ROM of scapular, scapulothoracic and UE control with self care, reaching, carrying, lifting, house/yardwork, driving/computer work  [] (28174) Reviewed/Progressed HEP activities related to improving balance, coordination, kinesthetic sense, posture, motor skill, proprioception of scapular, scapulothoracic and UE control with self care, reaching, carrying, lifting, house/yardwork, driving/computer work      Manual Treatments:  PROM / STM / Oscillations-Mobs:  G-I, II, III, IV (PA's, Inf., Post.)  [x] (03288) Provided manual therapy to mobilize soft tissue/joints of cervical/CT, scapular GHJ and UE for the purpose of modulating pain, promoting relaxation,  increasing ROM, reducing/eliminating soft tissue swelling/inflammation/restriction, improving soft tissue extensibility and allowing for proper ROM for normal function with self care, reaching, carrying, lifting, house/yardwork, driving/computer work    Modalities:      [] GR/ESU 15 min    [] GR 15 min   [] ESU     [] CP    [] MHP    [x] declined    Charges:  Timed Code Treatment Minutes: 40   Total Treatment Minutes: 40     [] EVAL (LOW) 50545 (typically 20 minutes face-to-face)  [] EVAL (MOD) 12217 (typically 30 minutes face-to-face)  [] EVAL (HIGH) 47446 (typically 45 minutes face-to-face)  [] RE-EVAL     [x] MH(54337) x 2     [] IONTO  [] NMR (77540) x    [] VASO  [x] Manual (31745) x 1    [] Other:  [] TA x      [] Mech Traction (09819)  [] ES(attended) (80790)      [] ES (un) (23689):     GOALS: Patient stated goal: Pain free shoulder  [] Progressing: [] Met: [] Not Met: [] Adjusted    Therapist goals for Patient:   Short Term Goals: To be achieved in: 2 weeks  1. Independent in HEP and progression per patient tolerance, in order to prevent re-injury. [] Progressing: [x] Met: [] Not Met: [] Adjusted  2. Patient will have a decrease in pain to facilitate improvement in movement, function, and ADLs as indicated by Functional Deficits. [] Progressing: [x] Met: [] Not Met: [] Adjusted    Long Term Goals: To be achieved in: 12 weeks  1. Disability index score of 20% or less for the [facility] to assist with reaching prior level of function. [] Progressing: [x] Met: [] Not Met: [] Adjusted  2. Patient will demonstrate increased AROM to 160 flex, T11 IR all painfree to allow for proper joint functioning as indicated by patients Functional Deficits. [x] Progressing: [] Met: [] Not Met: [] Adjusted  3. Patient will demonstrate an increase in Strength to 4+/5 pain free to allow for proper functional mobility as indicated by patients Functional Deficits. [] Progressing: [x] Met: [] Not Met: [] Adjusted  4. Patient will return to normal lifting and daily activites without increased symptoms or restriction. [] Progressing: [x] Met: [] Not Met: [] Adjusted  5. Patient will demonstrate appropriate scapular mechanics with no UT compensation to reduce risk of re-injury. [] Progressing: [x] Met: [] Not Met: [] Adjusted      Overall Progression Towards Functional goals/ Treatment Progress Update:  [x] Patient is progressing as expected towards functional goals listed. [] Progression is slowed due to complexities/Impairments listed. [] Progression has been slowed due to co-morbidities.   [] Plan just implemented, too soon to assess goals progression <30days   [] Goals require adjustment due to lack of progress  [] Patient is not progressing as expected and requires additional follow up with physician  [] Other    Prognosis for POC: [x] Good [] Fair  [] Poor      Patient requires continued skilled intervention: [x] Yes  [] No      ASSESSMENT:  Tightness of rotational movements along with posterior capsule tightness. Improving inferior glide. Improved functional IR with ADLs. Fatigued at end of session. Improved AROM, still painful and stiff feeling. Treatment/Activity Tolerance:  [x] Patient tolerated treatment well [] Patient limited by fatique  [] Patient limited by pain  [] Patient limited by other medical complications  [] Other:     Patient Requires Follow-up: [x] Yes  [] No    PLAN: 2/11/2021 3-4 sessions over next 8 weeks for full return of functional mobility and strength for pain free return to PLOF  [x] Continue per plan of care [] Ellen Liriano current plan (see comments above)  [] Plan of care initiated [] Hold pending MD visit [] Discharge      Electronically signed by:  Andrei Jeter, PT , DPT [id number]      Note: If patient does not return for scheduled/ recommended follow up visits, this note will serve as a discharge from care along with most recent update on progress.

## 2021-01-12 ENCOUNTER — HOSPITAL ENCOUNTER (OUTPATIENT)
Dept: PHYSICAL THERAPY | Age: 51
Setting detail: THERAPIES SERIES
Discharge: HOME | End: 2021-01-12
Payer: COMMERCIAL

## 2021-01-12 PROCEDURE — 97110 THERAPEUTIC EXERCISES: CPT

## 2021-01-12 PROCEDURE — 97140 MANUAL THERAPY 1/> REGIONS: CPT

## 2021-01-12 NOTE — PLAN OF CARE
[facility],  [address]  Phone: [phone]  Fax [phone]    Physical Therapy Progress Note/ Daily Treatment Note    Date:  2021    Patient Name:  Marissa Kyle    :  1970  MRN: [id number]  Restrictions/Precautions:    Physician Information:  Referring Practitioner: Dr. Carole Holt  Medical/Treatment Diagnosis Information:  · Diagnosis: M25.512 (ICD-10-CM) - Left shoulder pain, unspecified chronicity  · Treatment Diagnosis: Left shoulder pain M25.512, left shoulder stiffness M25.612, left shoulder general weakness M62.82      [x] Conservative / [] Surgical - DOS:  Therapy Diagnosis/Practice Pattern:  Practice Pattern E: Localized Inflammation   Insurance/Certification information:  PT Insurance Information:  BCBS - 500D-80/20-$0CP-60PT/OT  Plan of care signed: [x] YES  [] NO  Number of Comorbidities:  []0     [x]1-2    []3+  Date of Patient follow up with Physician:     Is this a Progress Report:     [x]  Yes  []  No        If Yes:  Date Range for reporting period:  Beginning 12/3/2020  Ending     Progress report will be due (10 Rx or 30 days whichever is less):       Recertification will be due (POC Duration  / 90 days whichever is less): 2021     Progress Note: [x]  Yes  []  No  Next due by: Visit #10        Latex Allergy:  [x]NO      []YES  Preferred Language for Healthcare:   [x]English       []other:    Visit # Insurance Allowable   14 total  2021 60 PT     SUBJECTIVE:  Pt reports he is doing much better,     OBJECTIVE:   ? Observation:  Limited ER/IR passively, tight posterior capsule   ? Palpation:     Test used Initial score Current Score  2020   Pain Summary VAS 0-8 0-3   Functional questionnaire QDash 48%  32/55 18%  55   ROM flexion 155 P! Eqaual    ER T1 P! T2    ABD      IR L3 P!  T12 discomfort   Strength flexion 4+ P! 5    ER 4 P! 4+    ABD 4- P! 5 IR 4- P! 4+      RESTRICTIONS/PRECAUTIONS: none    Exercises/Interventions: bilateral unless noted otherwise  Therapeutic Ex Sets/reps Notes HEP   Supine pec stretch  X   Supine snow nkechi  Supine alt UE ext   Unable to achieve ER to perform snow nkechi - held today  Held snow angle   Quadruped thread the needle  Quadruped scap lift   X   TB wall slide with low trap set lift  TB 3 way on wall  DB scaption  DB hammer curl  DB supination curl 2 x 12  2 x 10 7#  2 x 10 10#  2 x 10 10 # OVL  RTB fatigue X   CC seated wide arm pull down  CC single arm row/chest press  CC double arm row  CC straight arm lat pulldown  CC ER walkout  CC IR walkout 2 x 10  2 x 10  2 x 10  2 x 10  12 x  12 x 80#  50#  80#  50#  BTB  BTB    Prone over SB row/ext  7#    Supine posterior capsule stretch  Sleeper IR stretch  Side lying behind back IR stretch 3 x 30\"  3 x 30\"  5#   3 x 30\"      Cane ER stretch   x   Supine SA punch t  90/120    SL ER 5# 2 x 15   x   IR strap stretch 3 x 30\"    Functional box lift 2 shelves 15 x  32#                 Pt ed: anatomy, PT progression, RICE      Manual Intervention      PROM, grade 3-4 joint mobs 15 min Focused on inferior glide of GHJ progressed from G3-4                                        NMR re-education                                                        Access Code: [email or web address]   URL: [email or web address]   Date: 10/05/2020   Prepared by: Jude Malhotra     Exercises  Supine Chest Stretch on Foam Roll - 2-3 sets - 10 Breaths - 1-2x daily - 7x weekly  Supine Snow nkechi - 10 reps - 2-3 sets - 1-2x daily - 7x weekly  Dead Bug Alternating Arm Extension - 10 reps - 2-3 sets - 1-2x daily - 7x weekly  Quadruped Thoracic Rotation - Reach Under - 10 reps - 2-3 sets - 1-2x daily - 7x weekly  Quadruped Shoulder Flexion with Supination and Lift - 10 reps - 2-3 sets - 1-2x daily - 7x weekly  Standing Low Trap Setting with Resistance at [address] 10 reps - 2-3 sets - 1-2x daily - 7x weekly Therapeutic Exercise and NMR EXR  [x] (43114) Provided verbal/tactile cueing for activities related to strengthening, flexibility, endurance, ROM  for improvements in scapular, scapulothoracic and UE control with self care, reaching, carrying, lifting, house/yardwork, driving/computer work.    [] (65679) Provided verbal/tactile cueing for activities related to improving balance, coordination, kinesthetic sense, posture, motor skill, proprioception  to assist with  scapular, scapulothoracic and UE control with self care, reaching, carrying, lifting, house/yardwork, driving/computer work. Therapeutic Activities:    [] (27252 or 29994) Provided verbal/tactile cueing for activities related to improving balance, coordination, kinesthetic sense, posture, motor skill, proprioception and motor activation to allow for proper function of scapular, scapulothoracic and UE control with self care, carrying, lifting, driving/computer work.      Home Exercise Program:    [x] (35183) Reviewed/Progressed HEP activities related to strengthening, flexibility, endurance, ROM of scapular, scapulothoracic and UE control with self care, reaching, carrying, lifting, house/yardwork, driving/computer work  [] (91529) Reviewed/Progressed HEP activities related to improving balance, coordination, kinesthetic sense, posture, motor skill, proprioception of scapular, scapulothoracic and UE control with self care, reaching, carrying, lifting, house/yardwork, driving/computer work      Manual Treatments:  PROM / STM / Oscillations-Mobs:  G-I, II, III, IV (PA's, Inf., Post.) [x] (29399) Provided manual therapy to mobilize soft tissue/joints of cervical/CT, scapular GHJ and UE for the purpose of modulating pain, promoting relaxation,  increasing ROM, reducing/eliminating soft tissue swelling/inflammation/restriction, improving soft tissue extensibility and allowing for proper ROM for normal function with self care, reaching, carrying, lifting, house/yardwork, driving/computer work    Modalities:      [] GR/ESU 15 min    [] GR 15 min   [] ESU     [] CP    [] MHP    [x] declined    Charges:  Timed Code Treatment Minutes: 40   Total Treatment Minutes: 40     [] EVAL (LOW) 67036 (typically 20 minutes face-to-face)  [] EVAL (MOD) 55529 (typically 30 minutes face-to-face)  [] EVAL (HIGH) 49122 (typically 45 minutes face-to-face)  [] RE-EVAL     [x] XH(37744) x 2     [] IONTO  [] NMR (61078) x    [] VASO  [x] Manual (16589) x 1    [] Other:  [] TA x      [] Mech Traction (80595)  [] ES(attended) (83164)      [] ES (un) (08266):     GOALS: Patient stated goal: Pain free shoulder  [] Progressing: [] Met: [] Not Met: [] Adjusted    Therapist goals for Patient:   Short Term Goals: To be achieved in: 2 weeks  1. Independent in HEP and progression per patient tolerance, in order to prevent re-injury. [] Progressing: [x] Met: [] Not Met: [] Adjusted  2. Patient will have a decrease in pain to facilitate improvement in movement, function, and ADLs as indicated by Functional Deficits. [] Progressing: [x] Met: [] Not Met: [] Adjusted    Long Term Goals: To be achieved in: 12 weeks  1. Disability index score of 20% or less for the [facility] to assist with reaching prior level of function. [] Progressing: [x] Met: [] Not Met: [] Adjusted  2. Patient will demonstrate increased AROM to 160 flex, T11 IR all painfree to allow for proper joint functioning as indicated by patients Functional Deficits.    [x] Progressing: [] Met: [] Not Met: [] Adjusted Note: If patient does not return for scheduled/ recommended follow up visits, this note will serve as a discharge from care along with most recent update on progress.

## 2021-01-26 ENCOUNTER — HOSPITAL ENCOUNTER (OUTPATIENT)
Dept: PHYSICAL THERAPY | Age: 51
Setting detail: THERAPIES SERIES
Discharge: HOME | End: 2021-01-26
Payer: COMMERCIAL

## 2021-01-26 PROCEDURE — 97110 THERAPEUTIC EXERCISES: CPT

## 2021-01-26 PROCEDURE — 97140 MANUAL THERAPY 1/> REGIONS: CPT

## 2021-01-26 NOTE — PROGRESS NOTES
[facility], [address]  Phone: [phone]  Fax [phone]    Physical Therapy Progress Note/ Daily Treatment Note    Date:  2021    Patient Name:  Jacob Mustafa    :  1970  MRN: [id number]  Restrictions/Precautions:    Physician Information:  Referring Practitioner: Dr. Ethel Shipman  Medical/Treatment Diagnosis Information:  · Diagnosis: M25.512 (ICD-10-CM) - Left shoulder pain, unspecified chronicity  · Treatment Diagnosis: Left shoulder pain M25.512, left shoulder stiffness M25.612, left shoulder general weakness M62.82      [x] Conservative / [] Surgical - DOS:  Therapy Diagnosis/Practice Pattern:  Practice Pattern E: Localized Inflammation   Insurance/Certification information:  PT Insurance Information:  BCBS - 500D-80/20-$0CP-60PT/OT  Plan of care signed: [x] YES  [] NO  Number of Comorbidities:  []0     [x]1-2    []3+  Date of Patient follow up with Physician:     Is this a Progress Report:     [x]  Yes  []  No        If Yes:  Date Range for reporting period:  Beginning 12/3/2020  Ending  2021    Progress report will be due (10 Rx or 30 days whichever is less): POC 3/01/5353      Recertification will be due (POC Duration  / 90 days whichever is less): 2021       Latex Allergy:  [x]NO      []YES  Preferred Language for Healthcare:   [x]English       []other:    Visit # Insurance Allowable   15 total  2021 60 PT     SUBJECTIVE:  Pt reports shoulder is doing better. Not in excruciating pain anymore. Does not hurt with running. Still feels it some when reaching occasionally and occasionally with rotational movements. OBJECTIVE:   ? Observation:  Limited ER/IR passively, tight posterior capsule   ? Palpation:     Test used Initial score Current Score  2021   Pain Summary VAS 0-8 0-3   Functional questionnaire QDash 48%  32/55 13%  17   ROM flexion 155 P!  [place]ie [place] ER T1 P! T2    ABD      IR L3 P! T12 discomfort   Strength flexion 4+ P! 5    ER 4 P! 4+    ABD 4- P! 5    IR 4- P! 4+      RESTRICTIONS/PRECAUTIONS: none    Exercises/Interventions: bilateral unless noted otherwise  Therapeutic Ex Sets/reps Notes HEP   Supine pec stretch  X   Supine snow nkechi  Supine alt UE ext   Unable to achieve ER to perform snow nkechi - held today  Held snow angle   Quadruped thread the needle  Quadruped scap lift   X   TB wall slide with low trap set lift  TB 3 way on wall  DB scaption  DB hammer curl  DB supination curl 2 x 12  2 x 10 10#  2 x 10 10#  2 x 10 10 # OVL  RTB fatigue X   CC seated wide arm pull down  CC single arm row/chest press  CC double arm row  CC straight arm lat pulldown  CC ER walkout  CC IR walkout 2 x 10  2 x 10  2 x 10  2 x 10  12 x  12 x 80#  50#  80#  50#  BTB  BTB    Prone over SB row/ext  7#    Supine posterior capsule stretch  Sleeper IR stretch  Side lying behind back IR stretch 3 x 30\"  3 x 30\"  5#   3 x 30\"      Cane ER stretch   x   Supine SA punch t  90/120    SL ER 5# 2 x 15   x   IR strap stretch 3 x 30\"    Functional box lift 2 shelves 15 x  32#                 Pt ed: anatomy, PT progression, RICE      Manual Intervention      PROM, grade 3-4 joint mobs 15 min Focused on inferior glide of GHJ progressed from G3-4                                        NMR re-education                                                        Access Code: [email or web address]   URL: [email or web address]   Date: 10/05/2020   Prepared by: Keke Ramos     Exercises  Supine Chest Stretch on Foam Roll - 2-3 sets - 10 Breaths - 1-2x daily - 7x weekly  Supine Snow nkechi - 10 reps - 2-3 sets - 1-2x daily - 7x weekly  Dead Bug Alternating Arm Extension - 10 reps - 2-3 sets - 1-2x daily - 7x weekly  Quadruped Thoracic Rotation - Reach Under - 10 reps - 2-3 sets - 1-2x daily - 7x weekly  Quadruped Shoulder Flexion with Supination and Lift - 10 reps - 2-3 sets - 1-2x daily - 7x weekly Standing Low Trap Setting with Resistance at [facility] - 10 reps - 2-3 sets - 1-2x daily - 7x weekly    Therapeutic Exercise and NMR EXR  [x] (04910) Provided verbal/tactile cueing for activities related to strengthening, flexibility, endurance, ROM  for improvements in scapular, scapulothoracic and UE control with self care, reaching, carrying, lifting, house/yardwork, driving/computer work.    [] (09842) Provided verbal/tactile cueing for activities related to improving balance, coordination, kinesthetic sense, posture, motor skill, proprioception  to assist with  scapular, scapulothoracic and UE control with self care, reaching, carrying, lifting, house/yardwork, driving/computer work. Therapeutic Activities:    [] (67635 or 59490) Provided verbal/tactile cueing for activities related to improving balance, coordination, kinesthetic sense, posture, motor skill, proprioception and motor activation to allow for proper function of scapular, scapulothoracic and UE control with self care, carrying, lifting, driving/computer work.      Home Exercise Program:    [x] (62918) Reviewed/Progressed HEP activities related to strengthening, flexibility, endurance, ROM of scapular, scapulothoracic and UE control with self care, reaching, carrying, lifting, house/yardwork, driving/computer work  [] (70975) Reviewed/Progressed HEP activities related to improving balance, coordination, kinesthetic sense, posture, motor skill, proprioception of scapular, scapulothoracic and UE control with self care, reaching, carrying, lifting, house/yardwork, driving/computer work      Manual Treatments:  PROM / STM / Oscillations-Mobs:  G-I, II, III, IV (PA's, Inf., Post.) [x] (64285) Provided manual therapy to mobilize soft tissue/joints of cervical/CT, scapular GHJ and UE for the purpose of modulating pain, promoting relaxation,  increasing ROM, reducing/eliminating soft tissue swelling/inflammation/restriction, improving soft tissue extensibility and allowing for proper ROM for normal function with self care, reaching, carrying, lifting, house/yardwork, driving/computer work    Modalities:      [] GR/ESU 15 min    [] GR 15 min   [] ESU     [] CP    [] MHP    [x] declined    Charges:  Timed Code Treatment Minutes: 40   Total Treatment Minutes: 40     [] EVAL (LOW) 07083 (typically 20 minutes face-to-face)  [] EVAL (MOD) 29356 (typically 30 minutes face-to-face)  [] EVAL (HIGH) 58178 (typically 45 minutes face-to-face)  [] RE-EVAL     [x] RE(42412) x 2     [] IONTO  [] NMR (67492) x    [] VASO  [x] Manual (52359) x 1    [] Other:  [] TA x      [] Mech Traction (23499)  [] ES(attended) (70670)      [] ES (un) (75150):     GOALS: Patient stated goal: Pain free shoulder  [] Progressing: [] Met: [] Not Met: [] Adjusted    Therapist goals for Patient:   Short Term Goals: To be achieved in: 2 weeks  1. Independent in HEP and progression per patient tolerance, in order to prevent re-injury. [] Progressing: [x] Met: [] Not Met: [] Adjusted  2. Patient will have a decrease in pain to facilitate improvement in movement, function, and ADLs as indicated by Functional Deficits. [] Progressing: [x] Met: [] Not Met: [] Adjusted    Long Term Goals: To be achieved in: 12 weeks  1. Disability index score of 20% or less for the [facility] to assist with reaching prior level of function. [] Progressing: [x] Met: [] Not Met: [] Adjusted  2. Patient will demonstrate increased AROM to 160 flex, T11 IR all painfree to allow for proper joint functioning as indicated by patients Functional Deficits.    [x] Progressing: [] Met: [] Not Met: [] Adjusted 3. Patient will demonstrate an increase in Strength to 4+/5 pain free to allow for proper functional mobility as indicated by patients Functional Deficits. [] Progressing: [x] Met: [] Not Met: [] Adjusted  4. Patient will return to normal lifting and daily activites without increased symptoms or restriction. [] Progressing: [x] Met: [] Not Met: [] Adjusted  5. Patient will demonstrate appropriate scapular mechanics with no UT compensation to reduce risk of re-injury. [] Progressing: [x] Met: [] Not Met: [] Adjusted      Overall Progression Towards Functional goals/ Treatment Progress Update:  [x] Patient is progressing as expected towards functional goals listed. [] Progression is slowed due to complexities/Impairments listed. [] Progression has been slowed due to co-morbidities. [] Plan just implemented, too soon to assess goals progression <30days   [] Goals require adjustment due to lack of progress  [] Patient is not progressing as expected and requires additional follow up with physician  [] Other    Prognosis for POC: [x] Good [] Fair  [] Poor      Patient requires continued skilled intervention: [x] Yes  [] No      ASSESSMENT:  Tightness of rotational movements along with posterior capsule tightness. Improving inferior glide. Improved functional IR with ADLs. Fatigued at end of session. Improved AROM, still painful and stiff feeling.     Treatment/Activity Tolerance:  [x] Patient tolerated treatment well [] Patient limited by fatique  [] Patient limited by pain  [] Patient limited by other medical complications  [] Other:     Patient Requires Follow-up: [x] Yes  [] No    PLAN: 2/11/2021 3-4 sessions over next 8 weeks for full return of functional mobility and strength for pain free return to PLOF  [x] Continue per plan of care [] Debbie Cunningham current plan (see comments above)  [] Plan of care initiated [] Hold pending MD visit [] Discharge      Electronically signed by:  Ricardo Owens, [address] , D[place] [id number]

## 2021-02-09 ENCOUNTER — HOSPITAL ENCOUNTER (OUTPATIENT)
Dept: PHYSICAL THERAPY | Age: 51
Setting detail: THERAPIES SERIES
End: 2021-02-09
Payer: COMMERCIAL

## 2021-02-16 ENCOUNTER — APPOINTMENT (OUTPATIENT)
Dept: PHYSICAL THERAPY | Age: 51
End: 2021-02-16
Payer: COMMERCIAL

## 2021-02-23 ENCOUNTER — HOSPITAL ENCOUNTER (OUTPATIENT)
Dept: PHYSICAL THERAPY | Age: 51
Setting detail: THERAPIES SERIES
Discharge: HOME | End: 2021-02-23
Payer: COMMERCIAL

## 2021-02-23 PROCEDURE — 97110 THERAPEUTIC EXERCISES: CPT

## 2021-02-23 PROCEDURE — 97140 MANUAL THERAPY 1/> REGIONS: CPT

## 2021-02-23 NOTE — DISCHARGE SUMMARY
[facility],  [address]  Phone: [phone]  Fax [phone]       Physical Therapy Discharge  Date: 2021        Patient Name:  Senthil Marx    :  1970  MRN: [id number]  Referring Physician:Dr. Issa Martinez  Medical/Treatment Diagnosis Information:  · Diagnosis: M25.512 (ICD-10-CM) - Left shoulder pain, unspecified chronicity  · Treatment Diagnosis: Left shoulder pain M25.512, left shoulder stiffness M25.612, left shoulder general weakness M62.82  [] Surgical [x] Conservative   Therapy Diagnosis/Practice Pattern:Pattern E localized inflammation      Number of Comorbidities:  []0     [x]1-2    []3+  Total number of visits: 16   Reporting Period:   Beginning Date:10/5/2020   End Date:2021     Test used Initial score Current Score  2021   Pain Summary VAS 0-8 0-3   Functional questionnaire QDash 48%  32 13%     ROM flexion 155 P! Equal    ER T1 P! T4    ABD      IR L3 P! T8    Strength flexion 4+ P! 5    ER 4 P! 4+    ABD 4- P! 5    IR 4- P! 4+       Treatment to date:  [x] Therapeutic Exercise    [x] Modalities:  [x] Therapeutic Activity             []Ultrasound            []Electrical Stimulation  [] Gait Training     []Cervical Traction    [] Lumbar Traction  [x] Neuromuscular Re-education [x] Cold/hotpack         []Iontophoresis  [x] Instruction in HEP      Other:  [x] Manual Therapy                   []                                  []   Assessment:  ? [x] All Goals were achieved.   ? [] The following goals were achieved (#'s):  ? [] The following goals were not achieved for the following reasons:/assessmen of improvement as it relates to each goal:    Plan of Care:  [x] Discharge from Therapy Services due to:    Reason for Discharge:

## 2021-02-23 NOTE — FLOWSHEET NOTE
ABD      IR L3 P! T8    Strength flexion 4+ P! 5    ER 4 P! 4+    ABD 4- P! 5    IR 4- P! 4+      RESTRICTIONS/PRECAUTIONS: none    Exercises/Interventions: bilateral unless noted otherwise  Therapeutic Ex Sets/reps Notes HEP   Supine pec stretch  X   Supine snow nkechi  Supine alt UE ext   Unable to achieve ER to perform snow nkechi - held today  Held snow angle   Quadruped thread the needle  Quadruped scap lift   X   TB wall slide with low trap set lift  TB 3 way on wall  DB scaption  DB hammer curl  DB supination curl 2 x 12  2 x 10 10#  2 x 10 10#  2 x 10 10 # OVL  RTB fatigue X   CC seated wide arm pull down  CC single arm row/chest press  CC double arm row  CC straight arm lat pulldown  CC ER   CC IR  2 x 10  2 x 10  2 x 10  2 x 10  2 x 10  2 x 10 80#  50#  80#  50#  10#  15#    Prone over SB row/ext  7#    Supine posterior capsule stretch  Sleeper IR stretch  Side lying behind back IR stretch 3 x 30\"  3 x 30\"  5#   3 x 30\"      Cane ER stretch   x   Supine SA punch t  90/120    SL ER    x   IR strap stretch 3 x 30\"    Functional box lift 2 shelves 15 x  32#                 Pt ed: anatomy, PT progression, RICE      Manual Intervention      PROM, grade 3-4 joint mobs 15 min Focused on inferior glide of GHJ progressed from G3-4                                        NMR re-education                                                        Access Code: [email or web address]   URL: [email or web address]   Date: 10/05/2020   Prepared by: Milka Gloria     Exercises  Supine Chest Stretch on Foam Roll - 2-3 sets - 10 Breaths - 1-2x daily - 7x weekly  Supine Snow nkechi - 10 reps - 2-3 sets - 1-2x daily - 7x weekly  Dead Bug Alternating Arm Extension - 10 reps - 2-3 sets - 1-2x daily - 7x weekly  Quadruped Thoracic Rotation - Reach Under - 10 reps - 2-3 sets - 1-2x daily - 7x weekly  Quadruped Shoulder Flexion with Supination and Lift - 10 reps - 2-3 sets - 1-2x daily - 7x weekly Standing Low Trap Setting with Resistance at [address] - 10 reps - 2-3 sets - 1-2x daily - 7x weekly    Therapeutic Exercise and NMR EXR  [x] (53095) Provided verbal/tactile cueing for activities related to strengthening, flexibility, endurance, ROM  for improvements in scapular, scapulothoracic and UE control with self care, reaching, carrying, lifting, house/yardwork, driving/computer work.    [] (77441) Provided verbal/tactile cueing for activities related to improving balance, coordination, kinesthetic sense, posture, motor skill, proprioception  to assist with  scapular, scapulothoracic and UE control with self care, reaching, carrying, lifting, house/yardwork, driving/computer work. Therapeutic Activities:    [] (02901 or 99978) Provided verbal/tactile cueing for activities related to improving balance, coordination, kinesthetic sense, posture, motor skill, proprioception and motor activation to allow for proper function of scapular, scapulothoracic and UE control with self care, carrying, lifting, driving/computer work.      Home Exercise Program:    [x] (21825) Reviewed/Progressed HEP activities related to strengthening, flexibility, endurance, ROM of scapular, scapulothoracic and UE control with self care, reaching, carrying, lifting, house/yardwork, driving/computer work  [] (54321) Reviewed/Progressed HEP activities related to improving balance, coordination, kinesthetic sense, posture, motor skill, proprioception of scapular, scapulothoracic and UE control with self care, reaching, carrying, lifting, house/yardwork, driving/computer work      Manual Treatments:  PROM / STM / Oscillations-Mobs:  G-I, II, III, IV (PA's, Inf., Post.) [x] (98896) Provided manual therapy to mobilize soft tissue/joints of cervical/CT, scapular GHJ and UE for the purpose of modulating pain, promoting relaxation,  increasing ROM, reducing/eliminating soft tissue swelling/inflammation/restriction, improving soft tissue extensibility and allowing for proper ROM for normal function with self care, reaching, carrying, lifting, house/yardwork, driving/computer work    Modalities:      [] GR/ESU 15 min    [] GR 15 min   [] ESU     [] CP    [] MHP    [x] declined    Charges:  Timed Code Treatment Minutes: 40   Total Treatment Minutes: 40     [] EVAL (LOW) 87915 (typically 20 minutes face-to-face)  [] EVAL (MOD) 67278 (typically 30 minutes face-to-face)  [] EVAL (HIGH) 74106 (typically 45 minutes face-to-face)  [] RE-EVAL     [x] GR(36103) x 2     [] IONTO  [] NMR (13216) x    [] VASO  [x] Manual (58776) x 1    [] Other:  [] TA x      [] Mech Traction (02238)  [] ES(attended) (82861)      [] ES (un) (82694):     GOALS: Patient stated goal: Pain free shoulder  [] Progressing: [] Met: [] Not Met: [] Adjusted    Therapist goals for Patient:   Short Term Goals: To be achieved in: 2 weeks  1. Independent in HEP and progression per patient tolerance, in order to prevent re-injury. [] Progressing: [x] Met: [] Not Met: [] Adjusted  2. Patient will have a decrease in pain to facilitate improvement in movement, function, and ADLs as indicated by Functional Deficits. [] Progressing: [x] Met: [] Not Met: [] Adjusted    Long Term Goals: To be achieved in: 12 weeks  1. Disability index score of 20% or less for the [facility] to assist with reaching prior level of function. [] Progressing: [x] Met: [] Not Met: [] Adjusted  2. Patient will demonstrate increased AROM to 160 flex, T11 IR all painfree to allow for proper joint functioning as indicated by patients Functional Deficits.    [] Progressing: [x] Met: [] Not Met: [] Adjusted 3. Patient will demonstrate an increase in Strength to 4+/5 pain free to allow for proper functional mobility as indicated by patients Functional Deficits. [] Progressing: [x] Met: [] Not Met: [] Adjusted  4. Patient will return to normal lifting and daily activites without increased symptoms or restriction. [] Progressing: [x] Met: [] Not Met: [] Adjusted  5. Patient will demonstrate appropriate scapular mechanics with no UT compensation to reduce risk of re-injury. [] Progressing: [x] Met: [] Not Met: [] Adjusted      Overall Progression Towards Functional goals/ Treatment Progress Update:  [x] Patient is progressing as expected towards functional goals listed. [] Progression is slowed due to complexities/Impairments listed. [] Progression has been slowed due to co-morbidities. [] Plan just implemented, too soon to assess goals progression <30days   [] Goals require adjustment due to lack of progress  [] Patient is not progressing as expected and requires additional follow up with physician  [] Other    Prognosis for POC: [x] Good [] Fair  [] Poor      Patient requires continued skilled intervention: [x] Yes  [] No      ASSESSMENT:  Tightness of rotational movements along with posterior capsule tightness. Improving inferior glide. Improved functional IR with ADLs. Fatigued at end of session. Improved AROM, still painful and stiff feeling.     Treatment/Activity Tolerance:  [x] Patient tolerated treatment well [] Patient limited by fatique  [] Patient limited by pain  [] Patient limited by other medical complications  [] Other:     Patient Requires Follow-up: [x] Yes  [] No    PLAN: All goals met  [] Continue per plan of care [] Alter current plan (see comments above)  [] Plan of care initiated [] Hold pending MD visit [x] Discharge      Electronically signed by:  Candelaria Figueroa , DPT [id number]

## 2021-03-15 ENCOUNTER — IMMUNIZATION (OUTPATIENT)
Dept: PRIMARY CARE CLINIC | Age: 51
End: 2021-03-15
Payer: COMMERCIAL

## 2021-03-15 PROCEDURE — 0001A COVID-19, PFIZER VACCINE 30MCG/0.3ML DOSE: CPT | Performed by: FAMILY MEDICINE

## 2021-03-15 PROCEDURE — 91300 COVID-19, PFIZER VACCINE 30MCG/0.3ML DOSE: CPT | Performed by: FAMILY MEDICINE

## 2021-04-12 ENCOUNTER — IMMUNIZATION (OUTPATIENT)
Dept: PRIMARY CARE CLINIC | Age: 51
End: 2021-04-12
Payer: COMMERCIAL

## 2021-04-12 PROCEDURE — 0002A COVID-19, PFIZER VACCINE 30MCG/0.3ML DOSE: CPT | Performed by: FAMILY MEDICINE

## 2021-04-12 PROCEDURE — 91300 COVID-19, PFIZER VACCINE 30MCG/0.3ML DOSE: CPT | Performed by: FAMILY MEDICINE

## 2021-10-08 ENCOUNTER — IMMUNIZATION (OUTPATIENT)
Dept: FAMILY MEDICINE CLINIC | Age: 51
End: 2021-10-08
Payer: COMMERCIAL

## 2021-10-08 DIAGNOSIS — Z23 NEEDS FLU SHOT: Primary | ICD-10-CM

## 2021-10-08 PROCEDURE — 90686 IIV4 VACC NO PRSV 0.5 ML IM: CPT | Performed by: FAMILY MEDICINE

## 2021-10-08 PROCEDURE — 90471 IMMUNIZATION ADMIN: CPT | Performed by: FAMILY MEDICINE

## 2021-10-08 NOTE — PROGRESS NOTES
Vaccine Information Sheet, \"Influenza - Inactivated\"  given to Haley Curry, or parent/legal guardian of  Haley Curry and verbalized understanding. Patient responses:    Have you ever had a reaction to a flu vaccine? No  Do you have any current illness? No  Have you ever had Guillian Bard Syndrome? No  Do you have a serious allergy to any of the follow: Neomycin, Polymyxin, Thimerosal, eggs or egg products? No    Flu vaccine given per order. Please see immunization tab. Risks and benefits explained. Current VIS given.

## 2022-08-10 ENCOUNTER — OFFICE VISIT (OUTPATIENT)
Dept: FAMILY MEDICINE CLINIC | Age: 52
End: 2022-08-10
Payer: COMMERCIAL

## 2022-08-10 VITALS
HEIGHT: 72 IN | OXYGEN SATURATION: 99 % | BODY MASS INDEX: 25 KG/M2 | WEIGHT: 184.6 LBS | DIASTOLIC BLOOD PRESSURE: 82 MMHG | SYSTOLIC BLOOD PRESSURE: 140 MMHG | HEART RATE: 72 BPM

## 2022-08-10 DIAGNOSIS — Z23 NEED FOR SHINGLES VACCINE: ICD-10-CM

## 2022-08-10 DIAGNOSIS — Z00.00 ROUTINE GENERAL MEDICAL EXAMINATION AT A HEALTH CARE FACILITY: Primary | ICD-10-CM

## 2022-08-10 DIAGNOSIS — R03.0 ELEVATED BP WITHOUT DIAGNOSIS OF HYPERTENSION: ICD-10-CM

## 2022-08-10 PROCEDURE — 99396 PREV VISIT EST AGE 40-64: CPT | Performed by: FAMILY MEDICINE

## 2022-08-10 ASSESSMENT — PATIENT HEALTH QUESTIONNAIRE - PHQ9
2. FEELING DOWN, DEPRESSED OR HOPELESS: 0
SUM OF ALL RESPONSES TO PHQ QUESTIONS 1-9: 0
5. POOR APPETITE OR OVEREATING: 0
SUM OF ALL RESPONSES TO PHQ9 QUESTIONS 1 & 2: 0
4. FEELING TIRED OR HAVING LITTLE ENERGY: 0
SUM OF ALL RESPONSES TO PHQ QUESTIONS 1-9: 0
3. TROUBLE FALLING OR STAYING ASLEEP: 0
9. THOUGHTS THAT YOU WOULD BE BETTER OFF DEAD, OR OF HURTING YOURSELF: 0
6. FEELING BAD ABOUT YOURSELF - OR THAT YOU ARE A FAILURE OR HAVE LET YOURSELF OR YOUR FAMILY DOWN: 0
1. LITTLE INTEREST OR PLEASURE IN DOING THINGS: 0
10. IF YOU CHECKED OFF ANY PROBLEMS, HOW DIFFICULT HAVE THESE PROBLEMS MADE IT FOR YOU TO DO YOUR WORK, TAKE CARE OF THINGS AT HOME, OR GET ALONG WITH OTHER PEOPLE: 0
7. TROUBLE CONCENTRATING ON THINGS, SUCH AS READING THE NEWSPAPER OR WATCHING TELEVISION: 0
8. MOVING OR SPEAKING SO SLOWLY THAT OTHER PEOPLE COULD HAVE NOTICED. OR THE OPPOSITE, BEING SO FIGETY OR RESTLESS THAT YOU HAVE BEEN MOVING AROUND A LOT MORE THAN USUAL: 0

## 2022-08-10 NOTE — PROGRESS NOTES
History and Physical      Katja Cruz  YOB: 1970    Date of Service:  8/10/2022    Chief Complaint:   Katja Cruz is a 46 y.o. male who presents for complete physical examination. HPI: Patient is here for an annual wellness visit. He states he had a colonoscopy in 2020. We will track down that report. He has no acute issues today. He does feel anxious being here and this is reflected in his elevated blood pressure.     Wt Readings from Last 3 Encounters:   08/10/22 184 lb 9.6 oz (83.7 kg)   09/29/20 180 lb 9.6 oz (81.9 kg)   09/28/20 180 lb 6.4 oz (81.8 kg)     BP Readings from Last 3 Encounters:   08/10/22 (!) 140/82   09/28/20 122/84   10/18/19 122/86       Patient Active Problem List   Diagnosis    Anxiety       Preventive Care:  Health Maintenance   Topic Date Due    HIV screen  Never done    Hepatitis C screen  Never done    Colorectal Cancer Screen  Never done    Shingles vaccine (1 of 2) Never done    Diabetes screen  02/18/2022    COVID-19 Vaccine (4 - Booster for Pfizer series) 04/02/2022    Flu vaccine (1) 09/01/2022    Lipids  02/27/2023    Depression Screen  08/10/2023    DTaP/Tdap/Td vaccine (2 - Td or Tdap) 12/04/2025    Hepatitis A vaccine  Aged Out    Hepatitis B vaccine  Aged Out    Hib vaccine  Aged Out    Meningococcal (ACWY) vaccine  Aged Out    Pneumococcal 0-64 years Vaccine  Aged Out     Last eye exam: 2022, normal  Exercise: yes    Lipid panel:    Lab Results   Component Value Date/Time    TRIG 78 02/27/2018 08:37 AM    HDL 41 02/27/2018 08:37 AM    LDLCALC 109 02/27/2018 08:37 AM         Immunization History   Administered Date(s) Administered    COVID-19, PFIZER PURPLE top, DILUTE for use, (age 15 y+), 30mcg/0.3mL 03/15/2021, 04/12/2021, 12/02/2021    Influenza Vaccine, unspecified formulation 10/25/2016    Influenza Virus Vaccine 10/18/2019    Influenza, Intradermal, Preservative free 12/04/2015    Influenza, Quadv, IM, PF (6 mo and older Fluzone, Flulaval, Fluarix, and 3 yrs and older Afluria) 09/28/2020, 10/08/2021    Tdap (Boostrix, Adacel) 12/04/2015       No Known Allergies  No current outpatient medications on file. No current facility-administered medications for this visit. No past medical history on file. No past surgical history on file. Family History   Adopted: Yes   Problem Relation Age of Onset    Diabetes Father     High Blood Pressure Father     High Cholesterol Father     Heart Disease Father      Social History     Socioeconomic History    Marital status: Life Partner     Spouse name: Not on file    Number of children: Not on file    Years of education: Not on file    Highest education level: Not on file   Occupational History    Occupation:  [employer]   Tobacco Use    Smoking status: Never    Smokeless tobacco: Never   Substance and Sexual Activity    Alcohol use: Yes     Alcohol/week: 3.0 standard drinks     Types: 3 Glasses of wine per week    Drug use: Never    Sexual activity: Not on file   Other Topics Concern    Not on file   Social History Narrative    yoga 3 times a week, healthy eater, good water intake , 2 cups coffee/D     Social Determinants of Health     Financial Resource Strain: Not on file   Food Insecurity: Not on file   Transportation Needs: Not on file   Physical Activity: Not on file   Stress: Not on file   Social Connections: Not on file   Intimate Partner Violence: Not on file   Housing Stability: Not on file       Review of Systems:  A comprehensive review of systems was negative except for what was noted in the HPI. Physical Exam:   Vitals:    08/10/22 1430 08/10/22 1502   BP: (!) 140/78 (!) 140/82   Pulse: 72    SpO2: 99%    Weight: 184 lb 9.6 oz (83.7 kg)    Height: 6' (1.829 m)      Body mass index is 25.04 kg/m². Constitutional: He is oriented to person, place, and time. He appears well-developed and well-nourished. No distress. HEENT:   Head: Normocephalic and atraumatic.    Right Ear: Tympanic membrane, external ear and ear canal normal.   Left Ear: Tympanic membrane, external ear and ear canal normal.   Nose: Nose normal.   Mouth/Throat:  No oropharyngeal exudate or posterior oropharyngeal erythema. He has no cervical adenopathy. Eyes: Conjunctivae and extraocular motions are normal.   Neck: . Neck supple. No JVD present. Carotid bruit is not present. No mass and no thyromegaly present. Cardiovascular: Normal rate, regular rhythm, normal heart sounds and intact distal pulses. Exam reveals no gallop and no friction rub. No murmur heard. Pulmonary/Chest: Effort normal and breath sounds normal. No respiratory distress. He has no wheezes, rhonchi or rales. Abdominal: Soft, non-tender. Bowel sounds and aorta are normal. There is no organomegaly, mass or bruit. Musculoskeletal:  He exhibits no edema. Neurological: He is alert and oriented to person, place, and time. No cranial nerve deficit. Coordination normal.   Skin: Skin is warm, dry and intact. No suspicious lesions are noted. Psychiatric: He has a normal mood and affect.  His speech is normal and behavior is normal. Judgment, cognition and memory are normal.           Lab Review:   Lab Results   Component Value Date/Time     03/01/2019 08:20 PM    K 3.4 03/01/2019 08:20 PM     03/01/2019 08:20 PM    CO2 21 03/01/2019 08:20 PM    BUN 16 03/01/2019 08:20 PM    CREATININE 1.1 03/01/2019 08:20 PM    GLUCOSE 114 03/01/2019 08:20 PM    CALCIUM 10.2 03/01/2019 08:20 PM     Lab Results   Component Value Date/Time    WBC 8.6 03/01/2019 08:20 PM    HGB 16.0 03/01/2019 08:20 PM    HCT 46.1 03/01/2019 08:20 PM    MCV 88.1 03/01/2019 08:20 PM     03/01/2019 08:20 PM     Lab Results   Component Value Date/Time    CHOL 166 02/27/2018 08:37 AM    TRIG 78 02/27/2018 08:37 AM    HDL 41 02/27/2018 08:37 AM        Assessment/Plan:    Mariola Cast was seen today for annual exam.    Diagnoses and all orders for this visit:    Routine general medical examination at a health care facility, patient will return for fasting blood work and his first shingles shot  -     Cancel: Lipid Panel; Future  -     Cancel: Comprehensive Metabolic Panel; Future  -     Cancel: CBC with Auto Differential; Future  -     Cancel: Hepatitis C Antibody; Future  -     Cancel: HIV Screen; Future  -     Cancel: Hemoglobin A1C; Future  -     Hemoglobin A1C; Future  -     Lipid Panel; Future  -     Comprehensive Metabolic Panel; Future  -     Hepatitis C Antibody; Future  -     HIV Screen; Future  -     CBC with Auto Differential; Future    Need for shingles vaccine, discussed potential side effects  -     Zoster, SHINGRIX, (18 yrs +), IM; Future    Elevated BP without diagnosis of hypertension, patient was given a copy of the Mediterranean meal guide and a blood pressure log. He will have his blood pressure checked at work and send a [employer] in a couple weeks with an update on his blood pressure numbers outside of our office    Anticipatory guidance. We are taking care of his preventative lab work when he returns. When we get a copy of his colonoscopy report we will have a better idea of when his next one is due.   Reviewed the importance of heart healthy food choices and regular exercise and weight control

## 2022-08-12 ENCOUNTER — NURSE ONLY (OUTPATIENT)
Dept: FAMILY MEDICINE CLINIC | Age: 52
End: 2022-08-12
Payer: COMMERCIAL

## 2022-08-12 DIAGNOSIS — Z00.00 ROUTINE GENERAL MEDICAL EXAMINATION AT A HEALTH CARE FACILITY: ICD-10-CM

## 2022-08-12 DIAGNOSIS — Z23 NEED FOR SHINGLES VACCINE: ICD-10-CM

## 2022-08-12 LAB
A/G RATIO: 2.3 (ref 1.1–2.2)
ALBUMIN SERPL-MCNC: 4.6 G/DL (ref 3.4–5)
ALP BLD-CCNC: 72 U/L (ref 40–129)
ALT SERPL-CCNC: 21 U/L (ref 10–40)
ANION GAP SERPL CALCULATED.3IONS-SCNC: 11 MMOL/L (ref 3–16)
AST SERPL-CCNC: 23 U/L (ref 15–37)
BASOPHILS ABSOLUTE: 0.1 K/UL (ref 0–0.2)
BASOPHILS RELATIVE PERCENT: 0.8 %
BILIRUB SERPL-MCNC: 0.7 MG/DL (ref 0–1)
BUN BLDV-MCNC: 16 MG/DL (ref 7–20)
CALCIUM SERPL-MCNC: 9.5 MG/DL (ref 8.3–10.6)
CHLORIDE BLD-SCNC: 102 MMOL/L (ref 99–110)
CHOLESTEROL, TOTAL: 159 MG/DL (ref 0–199)
CO2: 27 MMOL/L (ref 21–32)
CREAT SERPL-MCNC: 1.1 MG/DL (ref 0.9–1.3)
EOSINOPHILS ABSOLUTE: 0.1 K/UL (ref 0–0.6)
EOSINOPHILS RELATIVE PERCENT: 1.4 %
GFR AFRICAN AMERICAN: >60
GFR NON-AFRICAN AMERICAN: >60
GLUCOSE BLD-MCNC: 88 MG/DL (ref 70–99)
HCT VFR BLD CALC: 42.1 % (ref 40.5–52.5)
HDLC SERPL-MCNC: 43 MG/DL (ref 40–60)
HEMOGLOBIN: 14.6 G/DL (ref 13.5–17.5)
HEPATITIS C ANTIBODY INTERPRETATION: NORMAL
LDL CHOLESTEROL CALCULATED: 101 MG/DL
LYMPHOCYTES ABSOLUTE: 1.7 K/UL (ref 1–5.1)
LYMPHOCYTES RELATIVE PERCENT: 28.9 %
MCH RBC QN AUTO: 30.6 PG (ref 26–34)
MCHC RBC AUTO-ENTMCNC: 34.7 G/DL (ref 31–36)
MCV RBC AUTO: 88 FL (ref 80–100)
MONOCYTES ABSOLUTE: 0.5 K/UL (ref 0–1.3)
MONOCYTES RELATIVE PERCENT: 7.7 %
NEUTROPHILS ABSOLUTE: 3.7 K/UL (ref 1.7–7.7)
NEUTROPHILS RELATIVE PERCENT: 61.2 %
PDW BLD-RTO: 12.9 % (ref 12.4–15.4)
PLATELET # BLD: 280 K/UL (ref 135–450)
PMV BLD AUTO: 7.8 FL (ref 5–10.5)
POTASSIUM SERPL-SCNC: 4.4 MMOL/L (ref 3.5–5.1)
RBC # BLD: 4.78 M/UL (ref 4.2–5.9)
SODIUM BLD-SCNC: 140 MMOL/L (ref 136–145)
TOTAL PROTEIN: 6.6 G/DL (ref 6.4–8.2)
TRIGL SERPL-MCNC: 73 MG/DL (ref 0–150)
VLDLC SERPL CALC-MCNC: 15 MG/DL
WBC # BLD: 6 K/UL (ref 4–11)

## 2022-08-12 PROCEDURE — 90750 HZV VACC RECOMBINANT IM: CPT | Performed by: FAMILY MEDICINE

## 2022-08-12 PROCEDURE — 90471 IMMUNIZATION ADMIN: CPT | Performed by: FAMILY MEDICINE

## 2022-08-12 PROCEDURE — 36415 COLL VENOUS BLD VENIPUNCTURE: CPT | Performed by: FAMILY MEDICINE

## 2022-08-13 LAB
ESTIMATED AVERAGE GLUCOSE: 96.8 MG/DL
HBA1C MFR BLD: 5 %
HIV AG/AB: NORMAL
HIV ANTIGEN: NORMAL
HIV-1 ANTIBODY: NORMAL
HIV-2 AB: NORMAL

## 2022-10-21 ENCOUNTER — NURSE ONLY (OUTPATIENT)
Dept: FAMILY MEDICINE CLINIC | Age: 52
End: 2022-10-21
Payer: COMMERCIAL

## 2022-10-21 DIAGNOSIS — Z23 NEED FOR SHINGLES VACCINE: Primary | ICD-10-CM

## 2022-10-21 PROCEDURE — 90471 IMMUNIZATION ADMIN: CPT | Performed by: FAMILY MEDICINE

## 2022-10-21 PROCEDURE — 90750 HZV VACC RECOMBINANT IM: CPT | Performed by: FAMILY MEDICINE

## 2023-01-16 ENCOUNTER — OFFICE VISIT (OUTPATIENT)
Dept: FAMILY MEDICINE CLINIC | Age: 53
End: 2023-01-16
Payer: COMMERCIAL

## 2023-01-16 VITALS
BODY MASS INDEX: 25.06 KG/M2 | WEIGHT: 185 LBS | SYSTOLIC BLOOD PRESSURE: 138 MMHG | HEART RATE: 84 BPM | HEIGHT: 72 IN | DIASTOLIC BLOOD PRESSURE: 80 MMHG | OXYGEN SATURATION: 99 %

## 2023-01-16 DIAGNOSIS — J06.9 VIRAL URI: Primary | ICD-10-CM

## 2023-01-16 PROCEDURE — 99213 OFFICE O/P EST LOW 20 MIN: CPT | Performed by: FAMILY MEDICINE

## 2023-01-16 ASSESSMENT — ENCOUNTER SYMPTOMS
RHINORRHEA: 0
COUGH: 1
SHORTNESS OF BREATH: 0
WHEEZING: 0
VOICE CHANGE: 0
SORE THROAT: 0

## 2023-01-16 ASSESSMENT — PATIENT HEALTH QUESTIONNAIRE - PHQ9
SUM OF ALL RESPONSES TO PHQ QUESTIONS 1-9: 0
9. THOUGHTS THAT YOU WOULD BE BETTER OFF DEAD, OR OF HURTING YOURSELF: 0
2. FEELING DOWN, DEPRESSED OR HOPELESS: 0
6. FEELING BAD ABOUT YOURSELF - OR THAT YOU ARE A FAILURE OR HAVE LET YOURSELF OR YOUR FAMILY DOWN: 0
SUM OF ALL RESPONSES TO PHQ QUESTIONS 1-9: 0
10. IF YOU CHECKED OFF ANY PROBLEMS, HOW DIFFICULT HAVE THESE PROBLEMS MADE IT FOR YOU TO DO YOUR WORK, TAKE CARE OF THINGS AT HOME, OR GET ALONG WITH OTHER PEOPLE: 0
8. MOVING OR SPEAKING SO SLOWLY THAT OTHER PEOPLE COULD HAVE NOTICED. OR THE OPPOSITE, BEING SO FIGETY OR RESTLESS THAT YOU HAVE BEEN MOVING AROUND A LOT MORE THAN USUAL: 0
7. TROUBLE CONCENTRATING ON THINGS, SUCH AS READING THE NEWSPAPER OR WATCHING TELEVISION: 0
4. FEELING TIRED OR HAVING LITTLE ENERGY: 0
3. TROUBLE FALLING OR STAYING ASLEEP: 0
SUM OF ALL RESPONSES TO PHQ9 QUESTIONS 1 & 2: 0
5. POOR APPETITE OR OVEREATING: 0
1. LITTLE INTEREST OR PLEASURE IN DOING THINGS: 0

## 2023-01-16 NOTE — PROGRESS NOTES
Patient:  Jarocho bello 46 y.o. Melly Marks presents today with the following Chief Complaint(s):  Chief Complaint   Patient presents with    Cough     Pt states that he has had a cough since Friday, it got worse yesterday. Is not productive. Patient is here for evaluation of 3-day history of cough. He has been feeling tired and rundown as well. He denies fever. He does not have a productive cough is mainly dry. He does feel tight in his upper chest.  No pleuritic pain no shortness of breath. No history of smoking. He denies severe myalgias. His COVID test was negative yesterday. He has been taking Mucinex        No current outpatient medications on file. No current facility-administered medications for this visit. Patients past medical history, surgical history, family history, medications and allergies were all reviewed and updated as appropriate today. Review of Systems   Constitutional:  Positive for fatigue. Negative for fever. HENT:  Negative for rhinorrhea, sore throat and voice change. Respiratory:  Positive for cough. Negative for shortness of breath and wheezing. Physical Exam  Vitals reviewed. Constitutional:       General: He is not in acute distress. Appearance: He is not ill-appearing or toxic-appearing. Cardiovascular:      Heart sounds: Normal heart sounds. Pulmonary:      Effort: Pulmonary effort is normal. No respiratory distress. Breath sounds: Normal breath sounds. No wheezing, rhonchi or rales. Neurological:      Mental Status: He is alert. Vitals:    01/16/23 1621   BP: 138/80   Pulse: 84   SpO2: 99%   Weight: 185 lb (83.9 kg)   Height: 6' (1.829 m)       Assessment/Plan:   Kala Murillo was seen today for cough. Diagnoses and all orders for this visit:    Viral URI      , Patient is just 3 days into his viral URI.   He was asked to take symptomatic treatment with Mucinex, rest fluids Tylenol Advil he can take Delsym for the cough if needed.   If his symptoms worsen he will let me know

## 2023-04-19 ENCOUNTER — OFFICE VISIT (OUTPATIENT)
Dept: FAMILY MEDICINE CLINIC | Age: 53
End: 2023-04-19
Payer: COMMERCIAL

## 2023-04-19 VITALS
WEIGHT: 183.6 LBS | TEMPERATURE: 97.5 F | SYSTOLIC BLOOD PRESSURE: 148 MMHG | HEART RATE: 68 BPM | RESPIRATION RATE: 16 BRPM | DIASTOLIC BLOOD PRESSURE: 82 MMHG | OXYGEN SATURATION: 98 % | HEIGHT: 72 IN | BODY MASS INDEX: 24.87 KG/M2

## 2023-04-19 DIAGNOSIS — R04.0 EPISTAXIS: Primary | ICD-10-CM

## 2023-04-19 PROCEDURE — 99213 OFFICE O/P EST LOW 20 MIN: CPT | Performed by: PHYSICIAN ASSISTANT

## 2023-04-19 NOTE — PROGRESS NOTES
clear.  NECK: No lymphadenopathy or masses. Thyroid is smooth. Moves neck fully. HEART: Reg rate and rhythm. No murmurs, rubs, or gallops. LUNGS: Clear to auscultation. No wheezes, rales, or rhonchi. ABDOMEN:  Soft, bowel sounds present, non-tender, no masses or organomegaly. MUSCULOSKELETAL:  No clubbing, cyanosis or edema. Moves all joints without eliciting pain. NEUROLOGIC: Grossly non focal.   SKIN: Warm, dry, normal turgor. Cap refill <3secs. No rashes, petechiae, purpura. PSYCHIATRIC:  Mood, behavior, and judgement normal. Thought content normal.      ADDITIONAL STUDIES     Pertinent prior laboratory results and/or imaging reviewed.    Orders Placed This Encounter   Procedures    Ambulatory referral to ENT       Electronically signed by GIANFRANCO Bazan on 4/19/2023 at 2:16 PM

## 2023-04-24 NOTE — PROGRESS NOTES
[facility]      Patient Name: Tyesha Mcgrath Record Number:  [id number]  Primary Care Physician:  Blu Weston MD  Date of Consultation: 4/25/2023    Chief Complaint:   Chief Complaint   Patient presents with    New Patient     States that he gets 6 or more nosebleeds every couple weeks, saw PCP last week and was referred here. Has not had one since last week. HISTORY OF PRESENT ILLNESS  Keely Webster is a(n) 46 y.o. male who presents evaluation of left-sided epistaxis. He states that for the last weeks been having nosebleed in the left leg. It typically stop within 10 to 15 minutes. Not any blood thinners. He does not have any history of easy bleeding or bruising. His last nosebleed was approximately 1 week ago    Patient Active Problem List   Diagnosis    Anxiety     No past surgical history on file. Family History   Adopted: Yes   Problem Relation Age of Onset    Diabetes Father     High Blood Pressure Father     High Cholesterol Father     Heart Disease Father      Social History     Socioeconomic History    Marital status: Life Partner     Spouse name: Not on file    Number of children: Not on file    Years of education: Not on file    Highest education level: Not on file   Occupational History    Occupation:  [employer]   Tobacco Use    Smoking status: Never    Smokeless tobacco: Never   Vaping Use    Vaping Use: Never used   Substance and Sexual Activity    Alcohol use:  Yes     Alcohol/week: 3.0 standard drinks     Types: 3 Glasses of wine per week     Comment: 0-3 per month    Drug use: Never    Sexual activity: Not on file   Other Topics Concern    Not on file   Social History Narrative    yoga 3 times a week, healthy eater, good water intake , 2 cups coffee/D     Social Determinants of Health     Financial Resource Strain: Not on file   Food Insecurity: Not on file   Transportation Needs: Not on file   Physical Activity:

## 2023-04-25 ENCOUNTER — OFFICE VISIT (OUTPATIENT)
Dept: ENT CLINIC | Age: 53
End: 2023-04-25
Payer: COMMERCIAL

## 2023-04-25 VITALS — BODY MASS INDEX: 24.79 KG/M2 | WEIGHT: 183 LBS | HEIGHT: 72 IN | TEMPERATURE: 97.9 F | RESPIRATION RATE: 16 BRPM

## 2023-04-25 DIAGNOSIS — J34.2 DEVIATED NASAL SEPTUM: ICD-10-CM

## 2023-04-25 DIAGNOSIS — R04.0 EPISTAXIS: Primary | ICD-10-CM

## 2023-04-25 PROCEDURE — 99203 OFFICE O/P NEW LOW 30 MIN: CPT | Performed by: STUDENT IN AN ORGANIZED HEALTH CARE EDUCATION/TRAINING PROGRAM

## 2023-04-25 PROCEDURE — 31231 NASAL ENDOSCOPY DX: CPT | Performed by: STUDENT IN AN ORGANIZED HEALTH CARE EDUCATION/TRAINING PROGRAM

## 2023-04-25 ASSESSMENT — ENCOUNTER SYMPTOMS
RHINORRHEA: 0
EYE PAIN: 0
VOMITING: 0
SHORTNESS OF BREATH: 0
COUGH: 0
NAUSEA: 0

## 2023-07-06 ASSESSMENT — ENCOUNTER SYMPTOMS
EYE PAIN: 0
RHINORRHEA: 0
SHORTNESS OF BREATH: 0
VOMITING: 0
NAUSEA: 0
COUGH: 0

## 2023-07-06 NOTE — PROGRESS NOTES
[address]      Patient Name: [id number] [facility] Record Number:  [id number]  Primary Care Physician:  Vandana Perez MD  Date of Consultation: 7/7/2023    Chief Complaint:   Chief Complaint   Patient presents with    Follow-up     Patient states that he is still having some bleeding on the left side. He says it happens about once every week. The last one was last week and lasted about 10-15 minutes. HISTORY OF PRESENT ILLNESS  Layla José is a(n) 48 y.o. male who presents evaluation of left-sided epistaxis. He states that for the last weeks been having nosebleed in the left leg. It typically stop within 10 to 15 minutes. Not any blood thinners. He does not have any history of easy bleeding or bruising. His last nosebleed was approximately 1 week ago    Interval History 7/7/2023  Layla José states he continues have a nosebleed approximately 1 day/week that lasts 10-15 minutes. Patient Active Problem List   Diagnosis    Anxiety     No past surgical history on file. Family History   Adopted: Yes   Problem Relation Age of Onset    Diabetes Father     High Blood Pressure Father     High Cholesterol Father     Heart Disease Father      Social History     Socioeconomic History    Marital status: Life Partner     Spouse name: Not on file    Number of children: Not on file    Years of education: Not on file    Highest education level: Not on file   Occupational History    Occupation:  [employer]   Tobacco Use    Smoking status: Never    Smokeless tobacco: Never   Vaping Use    Vaping Use: Never used   Substance and Sexual Activity    Alcohol use:  Yes     Alcohol/week: 3.0 standard drinks     Types: 3 Glasses of wine per week     Comment: 0-3 per month    Drug use: Never    Sexual activity: Not on file   Other Topics Concern    Not on file   Social History Narrative    yoga 3 times a week, healthy eater, good water intake , 2 cups coffee/D

## 2023-07-07 ENCOUNTER — OFFICE VISIT (OUTPATIENT)
Dept: ENT CLINIC | Age: 53
End: 2023-07-07
Payer: COMMERCIAL

## 2023-07-07 VITALS
DIASTOLIC BLOOD PRESSURE: 92 MMHG | HEIGHT: 72 IN | HEART RATE: 79 BPM | WEIGHT: 183 LBS | TEMPERATURE: 97.4 F | BODY MASS INDEX: 24.79 KG/M2 | SYSTOLIC BLOOD PRESSURE: 159 MMHG | RESPIRATION RATE: 16 BRPM

## 2023-07-07 DIAGNOSIS — J34.2 DEVIATED NASAL SEPTUM: ICD-10-CM

## 2023-07-07 DIAGNOSIS — R04.0 EPISTAXIS: Primary | ICD-10-CM

## 2023-07-07 PROCEDURE — 99213 OFFICE O/P EST LOW 20 MIN: CPT | Performed by: STUDENT IN AN ORGANIZED HEALTH CARE EDUCATION/TRAINING PROGRAM

## 2023-07-07 PROCEDURE — 31238 NSL/SINS NDSC SRG NSL HEMRRG: CPT | Performed by: STUDENT IN AN ORGANIZED HEALTH CARE EDUCATION/TRAINING PROGRAM

## 2023-07-20 ASSESSMENT — ENCOUNTER SYMPTOMS
RHINORRHEA: 0
EYE PAIN: 0
COUGH: 0
NAUSEA: 0
VOMITING: 0
SHORTNESS OF BREATH: 0

## 2023-07-20 NOTE — PROGRESS NOTES
[address]      Patient Name: [id number] [facility] Record Number:  [id number]  Primary Care Physician:  Annetta Carrillo MD  Date of Consultation: 7/21/2023    Chief Complaint:   Chief Complaint   Patient presents with    Follow-up     Patient states that he is for a 2 week follow up. He states that he has not had any bleeding since his last visit. HISTORY OF PRESENT ILLNESS  Jimena Gupta is a(n) 48 y.o. male who presents evaluation of left-sided epistaxis. He states that for the last weeks been having nosebleed in the left leg. It typically stop within 10 to 15 minutes. Not any blood thinners. He does not have any history of easy bleeding or bruising. His last nosebleed was approximately 1 week ago    Interval History 7/7/2023  Jimena Gupta states he continues have a nosebleed approximately 1 day/week that lasts 10-15 minutes. Interval History 7/21/2023  Jimena Gupta has not had any further bleeding since last being seen     Patient Active Problem List   Diagnosis    Anxiety     No past surgical history on file. Family History   Adopted: Yes   Problem Relation Age of Onset    Diabetes Father     High Blood Pressure Father     High Cholesterol Father     Heart Disease Father      Social History     Socioeconomic History    Marital status: Life Partner     Spouse name: Not on file    Number of children: Not on file    Years of education: Not on file    Highest education level: Not on file   Occupational History    Occupation:  [employer]   Tobacco Use    Smoking status: Never    Smokeless tobacco: Never   Vaping Use    Vaping Use: Never used   Substance and Sexual Activity    Alcohol use:  Yes     Alcohol/week: 3.0 standard drinks     Types: 3 Glasses of wine per week     Comment: 0-3 per month    Drug use: Never    Sexual activity: Not on file   Other Topics Concern    Not on file   Social History Narrative    yoga 3 times a week, healthy eater, good

## 2023-07-21 ENCOUNTER — OFFICE VISIT (OUTPATIENT)
Dept: ENT CLINIC | Age: 53
End: 2023-07-21
Payer: COMMERCIAL

## 2023-07-21 VITALS
HEART RATE: 80 BPM | TEMPERATURE: 97.2 F | BODY MASS INDEX: 24.79 KG/M2 | HEIGHT: 72 IN | RESPIRATION RATE: 16 BRPM | SYSTOLIC BLOOD PRESSURE: 143 MMHG | WEIGHT: 183 LBS | DIASTOLIC BLOOD PRESSURE: 86 MMHG

## 2023-07-21 DIAGNOSIS — J34.2 DEVIATED NASAL SEPTUM: ICD-10-CM

## 2023-07-21 DIAGNOSIS — R04.0 EPISTAXIS: Primary | ICD-10-CM

## 2023-07-21 PROCEDURE — 99213 OFFICE O/P EST LOW 20 MIN: CPT | Performed by: STUDENT IN AN ORGANIZED HEALTH CARE EDUCATION/TRAINING PROGRAM

## 2023-09-07 ENCOUNTER — APPOINTMENT (OUTPATIENT)
Dept: GENERAL RADIOLOGY | Age: 53
End: 2023-09-07
Payer: COMMERCIAL

## 2023-09-07 ENCOUNTER — HOSPITAL ENCOUNTER (EMERGENCY)
Age: 53
Discharge: HOME | End: 2023-09-08
Attending: EMERGENCY MEDICINE
Payer: COMMERCIAL

## 2023-09-07 ENCOUNTER — APPOINTMENT (OUTPATIENT)
Dept: CT IMAGING | Age: 53
End: 2023-09-07
Payer: COMMERCIAL

## 2023-09-07 DIAGNOSIS — R50.9 FEVER OF UNKNOWN ORIGIN: Primary | ICD-10-CM

## 2023-09-07 LAB
ALBUMIN SERPL-MCNC: 4.4 G/DL (ref 3.4–5)
ALBUMIN/GLOB SERPL: 1.5 {RATIO} (ref 1.1–2.2)
ALP SERPL-CCNC: 94 U/L (ref 40–129)
ALT SERPL-CCNC: 21 U/L (ref 10–40)
ANION GAP SERPL CALCULATED.3IONS-SCNC: 11 MMOL/L (ref 3–16)
AST SERPL-CCNC: 21 U/L (ref 15–37)
BASOPHILS # BLD: 0.1 K/UL (ref 0–0.2)
BASOPHILS NFR BLD: 0.5 %
BILIRUB SERPL-MCNC: 0.6 MG/DL (ref 0–1)
BILIRUB UR QL STRIP.AUTO: NEGATIVE
BUN SERPL-MCNC: 19 MG/DL (ref 7–20)
CALCIUM SERPL-MCNC: 9.7 MG/DL (ref 8.3–10.6)
CHLORIDE SERPL-SCNC: 99 MMOL/L (ref 99–110)
CLARITY UR: CLEAR
CO2 SERPL-SCNC: 25 MMOL/L (ref 21–32)
COLOR UR: YELLOW
CREAT SERPL-MCNC: 1.3 MG/DL (ref 0.9–1.3)
DEPRECATED RDW RBC AUTO: 12.8 % (ref 12.4–15.4)
EOSINOPHIL # BLD: 0 K/UL (ref 0–0.6)
EOSINOPHIL NFR BLD: 0.1 %
GFR SERPLBLD CREATININE-BSD FMLA CKD-EPI: >60 ML/MIN/{1.73_M2}
GLUCOSE SERPL-MCNC: 180 MG/DL (ref 70–99)
GLUCOSE UR STRIP.AUTO-MCNC: NEGATIVE MG/DL
HCT VFR BLD AUTO: 41.1 % (ref 40.5–52.5)
HGB BLD-MCNC: 14.4 G/DL (ref 13.5–17.5)
HGB UR QL STRIP.AUTO: NEGATIVE
KETONES UR STRIP.AUTO-MCNC: NEGATIVE MG/DL
LACTATE BLDV-SCNC: 0.8 MMOL/L (ref 0.4–1.9)
LACTATE BLDV-SCNC: 3.1 MMOL/L (ref 0.4–1.9)
LEUKOCYTE ESTERASE UR QL STRIP.AUTO: NEGATIVE
LYMPHOCYTES # BLD: 1.1 K/UL (ref 1–5.1)
LYMPHOCYTES NFR BLD: 5.3 %
MAGNESIUM SERPL-MCNC: 1.8 MG/DL (ref 1.8–2.4)
MCH RBC QN AUTO: 30.7 PG (ref 26–34)
MCHC RBC AUTO-ENTMCNC: 35.2 G/DL (ref 31–36)
MCV RBC AUTO: 87.3 FL (ref 80–100)
MONOCYTES # BLD: 0.6 K/UL (ref 0–1.3)
MONOCYTES NFR BLD: 2.7 %
NEUTROPHILS # BLD: 19.6 K/UL (ref 1.7–7.7)
NEUTROPHILS NFR BLD: 91.4 %
NITRITE UR QL STRIP.AUTO: NEGATIVE
PH UR STRIP.AUTO: 7 [PH] (ref 5–8)
PLATELET # BLD AUTO: 293 K/UL (ref 135–450)
PMV BLD AUTO: 7.5 FL (ref 5–10.5)
POTASSIUM SERPL-SCNC: 3.5 MMOL/L (ref 3.5–5.1)
PROT SERPL-MCNC: 7.3 G/DL (ref 6.4–8.2)
PROT UR STRIP.AUTO-MCNC: NEGATIVE MG/DL
RBC # BLD AUTO: 4.7 M/UL (ref 4.2–5.9)
SARS-COV-2 RDRP RESP QL NAA+PROBE: NOT DETECTED
SODIUM SERPL-SCNC: 135 MMOL/L (ref 136–145)
SP GR UR STRIP.AUTO: 1.01 (ref 1–1.03)
UA COMPLETE W REFLEX CULTURE PNL UR: NORMAL
UA DIPSTICK W REFLEX MICRO PNL UR: NORMAL
URN SPEC COLLECT METH UR: NORMAL
UROBILINOGEN UR STRIP-ACNC: 0.2 E.U./DL
WBC # BLD AUTO: 21.4 K/UL (ref 4–11)

## 2023-09-07 PROCEDURE — 83605 ASSAY OF LACTIC ACID: CPT

## 2023-09-07 PROCEDURE — 80053 COMPREHEN METABOLIC PANEL: CPT

## 2023-09-07 PROCEDURE — 81003 URINALYSIS AUTO W/O SCOPE: CPT

## 2023-09-07 PROCEDURE — 71260 CT THORAX DX C+: CPT

## 2023-09-07 PROCEDURE — 6370000000 HC RX 637 (ALT 250 FOR IP): Performed by: EMERGENCY MEDICINE

## 2023-09-07 PROCEDURE — 6360000004 HC RX CONTRAST MEDICATION: Performed by: EMERGENCY MEDICINE

## 2023-09-07 PROCEDURE — 87040 BLOOD CULTURE FOR BACTERIA: CPT

## 2023-09-07 PROCEDURE — 85025 COMPLETE CBC W/AUTO DIFF WBC: CPT

## 2023-09-07 PROCEDURE — 2580000003 HC RX 258: Performed by: EMERGENCY MEDICINE

## 2023-09-07 PROCEDURE — 6360000002 HC RX W HCPCS: Performed by: EMERGENCY MEDICINE

## 2023-09-07 PROCEDURE — 99285 EMERGENCY DEPT VISIT HI MDM: CPT

## 2023-09-07 PROCEDURE — 87635 SARS-COV-2 COVID-19 AMP PRB: CPT

## 2023-09-07 PROCEDURE — 96374 THER/PROPH/DIAG INJ IV PUSH: CPT

## 2023-09-07 PROCEDURE — 71045 X-RAY EXAM CHEST 1 VIEW: CPT

## 2023-09-07 PROCEDURE — 83735 ASSAY OF MAGNESIUM: CPT

## 2023-09-07 RX ORDER — ACETAMINOPHEN 500 MG
1000 TABLET ORAL ONCE
Status: COMPLETED | OUTPATIENT
Start: 2023-09-07 | End: 2023-09-07

## 2023-09-07 RX ORDER — 0.9 % SODIUM CHLORIDE 0.9 %
1382 INTRAVENOUS SOLUTION INTRAVENOUS ONCE
Status: COMPLETED | OUTPATIENT
Start: 2023-09-07 | End: 2023-09-08

## 2023-09-07 RX ORDER — 0.9 % SODIUM CHLORIDE 0.9 %
1000 INTRAVENOUS SOLUTION INTRAVENOUS ONCE
Status: COMPLETED | OUTPATIENT
Start: 2023-09-07 | End: 2023-09-07

## 2023-09-07 RX ORDER — KETOROLAC TROMETHAMINE 30 MG/ML
30 INJECTION, SOLUTION INTRAMUSCULAR; INTRAVENOUS ONCE
Status: COMPLETED | OUTPATIENT
Start: 2023-09-07 | End: 2023-09-07

## 2023-09-07 RX ADMIN — KETOROLAC TROMETHAMINE 30 MG: 30 INJECTION, SOLUTION INTRAMUSCULAR; INTRAVENOUS at 22:40

## 2023-09-07 RX ADMIN — SODIUM CHLORIDE 1382 ML: 9 INJECTION, SOLUTION INTRAVENOUS at 22:36

## 2023-09-07 RX ADMIN — ACETAMINOPHEN 1000 MG: 500 TABLET ORAL at 21:50

## 2023-09-07 RX ADMIN — IOPAMIDOL 75 ML: 755 INJECTION, SOLUTION INTRAVENOUS at 23:08

## 2023-09-07 RX ADMIN — SODIUM CHLORIDE 1000 ML: 9 INJECTION, SOLUTION INTRAVENOUS at 21:50

## 2023-09-07 ASSESSMENT — PAIN - FUNCTIONAL ASSESSMENT: PAIN_FUNCTIONAL_ASSESSMENT: 0-10

## 2023-09-07 ASSESSMENT — PAIN SCALES - GENERAL: PAINLEVEL_OUTOF10: 4

## 2023-09-08 VITALS
TEMPERATURE: 98.8 F | HEIGHT: 72 IN | DIASTOLIC BLOOD PRESSURE: 94 MMHG | OXYGEN SATURATION: 95 % | HEART RATE: 94 BPM | WEIGHT: 175 LBS | SYSTOLIC BLOOD PRESSURE: 115 MMHG | RESPIRATION RATE: 12 BRPM | BODY MASS INDEX: 23.7 KG/M2

## 2023-09-08 LAB
BACTERIA BLD CULT ORG #2: NORMAL
BACTERIA BLD CULT: NORMAL

## 2023-09-08 RX ORDER — ACETAMINOPHEN 500 MG
1000 TABLET ORAL 4 TIMES DAILY PRN
Qty: 40 TABLET | Refills: 0 | Status: SHIPPED | OUTPATIENT
Start: 2023-09-08 | End: 2023-09-13

## 2023-09-08 RX ORDER — IBUPROFEN 800 MG/1
800 TABLET ORAL EVERY 8 HOURS PRN
Qty: 30 TABLET | Refills: 0 | Status: SHIPPED | OUTPATIENT
Start: 2023-09-08

## 2023-09-08 NOTE — ED NOTES
Pt ok to d/c to home. Pt given d/c instructions. Pt verbalized understating including Rx and follow up care. Pt ambulated to lobby for ride home.  0 s/s of distress at time of d/c.          Mariela Albarran RN  09/08/23 [id number]

## 2023-09-11 LAB
BACTERIA BLD CULT ORG #2: NORMAL
BACTERIA BLD CULT: NORMAL

## 2024-06-28 ENCOUNTER — OFFICE VISIT (OUTPATIENT)
Dept: ENT CLINIC | Age: 54
End: 2024-06-28

## 2024-06-28 VITALS
SYSTOLIC BLOOD PRESSURE: 155 MMHG | HEIGHT: 72 IN | DIASTOLIC BLOOD PRESSURE: 99 MMHG | HEART RATE: 81 BPM | BODY MASS INDEX: 25.06 KG/M2 | WEIGHT: 185 LBS

## 2024-06-28 DIAGNOSIS — R04.0 EPISTAXIS: Primary | ICD-10-CM

## 2024-06-28 DIAGNOSIS — J34.2 DEVIATED NASAL SEPTUM: ICD-10-CM

## 2024-06-28 ASSESSMENT — ENCOUNTER SYMPTOMS
EYE PAIN: 0
RHINORRHEA: 0
NAUSEA: 0
VOMITING: 0
SHORTNESS OF BREATH: 0
COUGH: 0

## 2024-06-28 NOTE — PROGRESS NOTES
[facility]  DIVISION OF OTOLARYNGOLOGY- HEAD & NECK SURGERY  CONSULT      Patient Name: Rui Jeff  Medical Record Number:  [id number]  Primary Care Physician:  Cristiana Hadley MD  Date of Consultation: 6/28/2024    Chief Complaint:   Chief Complaint   Patient presents with    Epistaxis     Epistaxis, last nosebleed 6/27 left nostril for 10 minutes. Occurs the last week every other day.        HISTORY OF PRESENT ILLNESS  Rui is a(n) 53 y.o. male who presents evaluation of left-sided epistaxis.  He states that for the last weeks been having nosebleed in the left leg.  It typically stop within 10 to 15 minutes.  Not any blood thinners.  He does not have any history of easy bleeding or bruising.  His last nosebleed was approximately 1 week ago    Interval History 7/7/2023  Rui states he continues have a nosebleed approximately 1 day/week that lasts 10-15 minutes.    Interval History 7/21/2023  Rui has not had any further bleeding since last being seen     Interval History 6/28/2024  Rui has not had any further bleeding over the last year but then in the last week developed nosebleeds on the left side that last from 10 to 20 minutes    Patient Active Problem List   Diagnosis    Anxiety     No past surgical history on file.  Family History   Adopted: Yes   Problem Relation Age of Onset    Diabetes Father     High Blood Pressure Father     High Cholesterol Father     Heart Disease Father      Social History     Socioeconomic History    Marital status: Life Partner     Spouse name: Not on file    Number of children: Not on file    Years of education: Not on file    Highest education level: Not on file   Occupational History    Occupation:  [employer]   Tobacco Use    Smoking status: Never    Smokeless tobacco: Never   Vaping Use    Vaping Use: Never used   Substance and Sexual Activity    Alcohol use: Not Currently     Alcohol/week: 3.0 standard drinks of alcohol     Types: 3 Glasses of wine per

## 2025-01-19 ASSESSMENT — PATIENT HEALTH QUESTIONNAIRE - PHQ9
2. FEELING DOWN, DEPRESSED OR HOPELESS: NOT AT ALL
SUM OF ALL RESPONSES TO PHQ9 QUESTIONS 1 & 2: 0
SUM OF ALL RESPONSES TO PHQ QUESTIONS 1-9: 0
1. LITTLE INTEREST OR PLEASURE IN DOING THINGS: NOT AT ALL
1. LITTLE INTEREST OR PLEASURE IN DOING THINGS: NOT AT ALL
SUM OF ALL RESPONSES TO PHQ QUESTIONS 1-9: 0
SUM OF ALL RESPONSES TO PHQ9 QUESTIONS 1 & 2: 0
SUM OF ALL RESPONSES TO PHQ QUESTIONS 1-9: 0
SUM OF ALL RESPONSES TO PHQ QUESTIONS 1-9: 0
2. FEELING DOWN, DEPRESSED OR HOPELESS: NOT AT ALL

## 2025-01-20 ENCOUNTER — OFFICE VISIT (OUTPATIENT)
Dept: FAMILY MEDICINE CLINIC | Age: 55
End: 2025-01-20
Payer: COMMERCIAL

## 2025-01-20 VITALS
HEART RATE: 68 BPM | HEIGHT: 72 IN | SYSTOLIC BLOOD PRESSURE: 148 MMHG | OXYGEN SATURATION: 98 % | BODY MASS INDEX: 25.87 KG/M2 | WEIGHT: 191 LBS | DIASTOLIC BLOOD PRESSURE: 80 MMHG

## 2025-01-20 DIAGNOSIS — Z12.5 SCREENING FOR PROSTATE CANCER: ICD-10-CM

## 2025-01-20 DIAGNOSIS — I10 PRIMARY HYPERTENSION: ICD-10-CM

## 2025-01-20 DIAGNOSIS — Z00.00 ROUTINE GENERAL MEDICAL EXAMINATION AT A HEALTH CARE FACILITY: Primary | ICD-10-CM

## 2025-01-20 PROCEDURE — 3077F SYST BP >= 140 MM HG: CPT | Performed by: FAMILY MEDICINE

## 2025-01-20 PROCEDURE — 3078F DIAST BP <80 MM HG: CPT | Performed by: FAMILY MEDICINE

## 2025-01-20 PROCEDURE — 99396 PREV VISIT EST AGE 40-64: CPT | Performed by: FAMILY MEDICINE

## 2025-01-20 RX ORDER — LISINOPRIL 5 MG/1
5 TABLET ORAL DAILY
Qty: 90 TABLET | Refills: 1 | Status: SHIPPED | OUTPATIENT
Start: 2025-01-20

## 2025-01-20 SDOH — ECONOMIC STABILITY: FOOD INSECURITY: WITHIN THE PAST 12 MONTHS, YOU WORRIED THAT YOUR FOOD WOULD RUN OUT BEFORE YOU GOT MONEY TO BUY MORE.: NEVER TRUE

## 2025-01-20 SDOH — ECONOMIC STABILITY: FOOD INSECURITY: WITHIN THE PAST 12 MONTHS, THE FOOD YOU BOUGHT JUST DIDN'T LAST AND YOU DIDN'T HAVE MONEY TO GET MORE.: NEVER TRUE

## 2025-01-20 NOTE — PROGRESS NOTES
Social Determinants of Health     Financial Resource Strain: Not on file   Food Insecurity: No Food Insecurity (1/20/2025)    Hunger Vital Sign     Worried About Running Out of Food in the Last Year: Never true     Ran Out of Food in the Last Year: Never true   Transportation Needs: No Transportation Needs (1/20/2025)    PRAPARE - Transportation     Lack of Transportation (Medical): No     Lack of Transportation (Non-Medical): No   Physical Activity: Not on file   Stress: Not on file (11/6/2024)   Social Connections: Not on file   Intimate Partner Violence: Not on file   Housing Stability: Low Risk  (1/20/2025)    Housing Stability Vital Sign     Unable to Pay for Housing in the Last Year: No     Number of Times Moved in the Last Year: 0     Homeless in the Last Year: No       Review of Systems:  A comprehensive review of systems was negative except for what was noted in the HPI.    Physical Exam:   Vitals:    01/20/25 1333 01/20/25 1349   BP: (!) 152/78 (!) 148/80   Pulse: 68    SpO2: 98%    Weight: 86.6 kg (191 lb)    Height: 1.829 m (6')      Body mass index is 25.9 kg/m².  Constitutional: He is oriented to person, place, and time. He appears well-developed and well-nourished. No distress.   HEENT:   Head: Normocephalic and atraumatic.   Right Ear: Tympanic membrane, external ear and ear canal normal.   Left Ear: Tympanic membrane, external ear and ear canal normal.   Nose: Nose normal.   Mouth/Throat: Oropharynx is clear and moist and mucous membranes are normal. No oropharyngeal exudate or posterior oropharyngeal erythema. He has no cervical adenopathy.   Eyes: Conjunctivae and extraocular motions are normal.   Neck: . Neck supple. No JVD present. Carotid bruit is not present. No mass and no thyromegaly present.   Cardiovascular: Normal rate, regular rhythm, normal heart sounds.  Exam reveals no gallop and no friction rub. No murmur heard.  Pulmonary/Chest: Effort normal and breath sounds normal. No

## 2025-01-21 ENCOUNTER — LAB (OUTPATIENT)
Dept: FAMILY MEDICINE CLINIC | Age: 55
End: 2025-01-21
Payer: COMMERCIAL

## 2025-01-21 ENCOUNTER — PATIENT MESSAGE (OUTPATIENT)
Dept: FAMILY MEDICINE CLINIC | Age: 55
End: 2025-01-21

## 2025-01-21 DIAGNOSIS — Z12.5 SCREENING FOR PROSTATE CANCER: ICD-10-CM

## 2025-01-21 DIAGNOSIS — Z00.00 ROUTINE GENERAL MEDICAL EXAMINATION AT A HEALTH CARE FACILITY: ICD-10-CM

## 2025-01-21 LAB
ALBUMIN SERPL-MCNC: 4.4 G/DL (ref 3.4–5)
ALBUMIN/GLOB SERPL: 1.7 {RATIO} (ref 1.1–2.2)
ALP SERPL-CCNC: 82 U/L (ref 40–129)
ALT SERPL-CCNC: 30 U/L (ref 10–40)
ANION GAP SERPL CALCULATED.3IONS-SCNC: 9 MMOL/L (ref 3–16)
AST SERPL-CCNC: 25 U/L (ref 15–37)
BASOPHILS # BLD: 0 K/UL (ref 0–0.2)
BASOPHILS NFR BLD: 0.8 %
BILIRUB SERPL-MCNC: 0.5 MG/DL (ref 0–1)
BUN SERPL-MCNC: 21 MG/DL (ref 7–20)
CALCIUM SERPL-MCNC: 9.5 MG/DL (ref 8.3–10.6)
CHLORIDE SERPL-SCNC: 105 MMOL/L (ref 99–110)
CHOLEST SERPL-MCNC: 175 MG/DL (ref 0–199)
CO2 SERPL-SCNC: 27 MMOL/L (ref 21–32)
CREAT SERPL-MCNC: 1.1 MG/DL (ref 0.9–1.3)
DEPRECATED RDW RBC AUTO: 13.6 % (ref 12.4–15.4)
EOSINOPHIL # BLD: 0.2 K/UL (ref 0–0.6)
EOSINOPHIL NFR BLD: 2.5 %
GFR SERPLBLD CREATININE-BSD FMLA CKD-EPI: 79 ML/MIN/{1.73_M2}
GLUCOSE SERPL-MCNC: 94 MG/DL (ref 70–99)
HCT VFR BLD AUTO: 43 % (ref 40.5–52.5)
HDLC SERPL-MCNC: 40 MG/DL (ref 40–60)
HGB BLD-MCNC: 14.7 G/DL (ref 13.5–17.5)
LDLC SERPL CALC-MCNC: 116 MG/DL
LYMPHOCYTES # BLD: 1.9 K/UL (ref 1–5.1)
LYMPHOCYTES NFR BLD: 30.8 %
MCH RBC QN AUTO: 30.7 PG (ref 26–34)
MCHC RBC AUTO-ENTMCNC: 34.3 G/DL (ref 31–36)
MCV RBC AUTO: 89.7 FL (ref 80–100)
MONOCYTES # BLD: 0.6 K/UL (ref 0–1.3)
MONOCYTES NFR BLD: 9.5 %
NEUTROPHILS # BLD: 3.5 K/UL (ref 1.7–7.7)
NEUTROPHILS NFR BLD: 56.4 %
PLATELET # BLD AUTO: 282 K/UL (ref 135–450)
PMV BLD AUTO: 8.1 FL (ref 5–10.5)
POTASSIUM SERPL-SCNC: 4.4 MMOL/L (ref 3.5–5.1)
PROT SERPL-MCNC: 7 G/DL (ref 6.4–8.2)
PSA SERPL DL<=0.01 NG/ML-MCNC: 0.97 NG/ML (ref 0–4)
RBC # BLD AUTO: 4.8 M/UL (ref 4.2–5.9)
SODIUM SERPL-SCNC: 141 MMOL/L (ref 136–145)
TRIGL SERPL-MCNC: 96 MG/DL (ref 0–150)
VLDLC SERPL CALC-MCNC: 19 MG/DL
WBC # BLD AUTO: 6.2 K/UL (ref 4–11)

## 2025-01-21 PROCEDURE — 36415 COLL VENOUS BLD VENIPUNCTURE: CPT | Performed by: FAMILY MEDICINE

## 2025-01-22 LAB
EST. AVERAGE GLUCOSE BLD GHB EST-MCNC: 91.1 MG/DL
HBA1C MFR BLD: 4.8 %

## 2025-03-07 ENCOUNTER — OFFICE VISIT (OUTPATIENT)
Dept: FAMILY MEDICINE CLINIC | Age: 55
End: 2025-03-07
Payer: COMMERCIAL

## 2025-03-07 VITALS
OXYGEN SATURATION: 98 % | SYSTOLIC BLOOD PRESSURE: 128 MMHG | HEIGHT: 72 IN | DIASTOLIC BLOOD PRESSURE: 64 MMHG | BODY MASS INDEX: 25.52 KG/M2 | WEIGHT: 188.4 LBS | HEART RATE: 71 BPM

## 2025-03-07 DIAGNOSIS — I10 PRIMARY HYPERTENSION: Primary | ICD-10-CM

## 2025-03-07 PROCEDURE — 3078F DIAST BP <80 MM HG: CPT | Performed by: FAMILY MEDICINE

## 2025-03-07 PROCEDURE — G2211 COMPLEX E/M VISIT ADD ON: HCPCS | Performed by: FAMILY MEDICINE

## 2025-03-07 PROCEDURE — 99213 OFFICE O/P EST LOW 20 MIN: CPT | Performed by: FAMILY MEDICINE

## 2025-03-07 PROCEDURE — 3074F SYST BP LT 130 MM HG: CPT | Performed by: FAMILY MEDICINE

## 2025-03-07 NOTE — PROGRESS NOTES
Alcohol/week: 3.0 standard drinks of alcohol     Types: 3 Glasses of wine per week     Comment: 0-3 per month       OBJECTIVE:   /64   Pulse 71   Ht 1.829 m (6')   Wt 85.5 kg (188 lb 6.4 oz)   SpO2 98%   BMI 25.55 kg/m²   NAD  Neck no bruit or JVD  S1 and S2 normal, no murmurs, clicks, gallops or rubs. Regular rate and rhythm. Chest is clear; no wheezes or rales. No edema or JVD.  Neuro alert, no CN or motor deficits  Psych nl mood, thought and judgement                EMR Dragon/transcription disclaimer:  Much of this encounter note is electronic transcription/translation of spoken language to printed texts.  The electronic translation of spoken language may be erroneous, or at times, nonsensical words or phrases may be inadvertently transcribed.  Although I have reviewed the note for such errors, some may still exist.

## 2025-07-13 DIAGNOSIS — I10 PRIMARY HYPERTENSION: ICD-10-CM

## 2025-07-14 RX ORDER — LISINOPRIL 5 MG/1
5 TABLET ORAL DAILY
Qty: 90 TABLET | Refills: 0 | Status: SHIPPED | OUTPATIENT
Start: 2025-07-14

## 2025-07-14 NOTE — TELEPHONE ENCOUNTER
Rui Jeff is requesting refill(s) lisinopril  Last OV 3/7/25 (pertaining to medication)  LR 1/20/25 (per medication requested)  Next office visit scheduled or attempted Yes   If no, reason:  9/12/25